# Patient Record
Sex: FEMALE | Race: WHITE | NOT HISPANIC OR LATINO | Employment: UNEMPLOYED | URBAN - NONMETROPOLITAN AREA
[De-identification: names, ages, dates, MRNs, and addresses within clinical notes are randomized per-mention and may not be internally consistent; named-entity substitution may affect disease eponyms.]

---

## 2017-01-03 ENCOUNTER — LAB (OUTPATIENT)
Dept: ONCOLOGY | Facility: CLINIC | Age: 52
End: 2017-01-03

## 2017-01-03 ENCOUNTER — INFUSION (OUTPATIENT)
Dept: ONCOLOGY | Facility: HOSPITAL | Age: 52
End: 2017-01-03

## 2017-01-03 VITALS
DIASTOLIC BLOOD PRESSURE: 56 MMHG | BODY MASS INDEX: 24.13 KG/M2 | TEMPERATURE: 97.9 F | WEIGHT: 158.7 LBS | SYSTOLIC BLOOD PRESSURE: 108 MMHG | RESPIRATION RATE: 18 BRPM | OXYGEN SATURATION: 98 % | HEART RATE: 86 BPM

## 2017-01-03 DIAGNOSIS — C34.31 MALIGNANT NEOPLASM OF LOWER LOBE OF RIGHT LUNG (HCC): Primary | ICD-10-CM

## 2017-01-03 DIAGNOSIS — C34.31 MALIGNANT NEOPLASM OF LOWER LOBE OF RIGHT LUNG (HCC): ICD-10-CM

## 2017-01-03 LAB
ALBUMIN SERPL-MCNC: 4.3 G/DL (ref 3.5–5)
ALBUMIN/GLOB SERPL: 1.6 G/DL (ref 1.5–2.5)
ALP SERPL-CCNC: 101 U/L (ref 46–116)
ALT SERPL W P-5'-P-CCNC: 15 U/L (ref 10–36)
ANION GAP SERPL CALCULATED.3IONS-SCNC: 4.5 MMOL/L (ref 3.6–11.2)
ANISOCYTOSIS BLD QL: NORMAL
AST SERPL-CCNC: 22 U/L (ref 10–30)
BASOPHILS # BLD AUTO: 0.04 10*3/MM3 (ref 0–0.3)
BASOPHILS NFR BLD AUTO: 0.7 % (ref 0–2)
BILIRUB SERPL-MCNC: 0.3 MG/DL (ref 0.2–1.8)
BUN BLD-MCNC: 12 MG/DL (ref 7–21)
BUN/CREAT SERPL: 14.8 (ref 7–25)
CALCIUM SPEC-SCNC: 9.4 MG/DL (ref 7.7–10)
CHLORIDE SERPL-SCNC: 111 MMOL/L (ref 99–112)
CO2 SERPL-SCNC: 27.5 MMOL/L (ref 24.3–31.9)
CREAT BLD-MCNC: 0.81 MG/DL (ref 0.43–1.29)
DACRYOCYTES BLD QL SMEAR: NORMAL
DEPRECATED RDW RBC AUTO: 78.5 FL (ref 37–54)
EOSINOPHIL # BLD AUTO: 0.04 10*3/MM3 (ref 0–0.7)
EOSINOPHIL NFR BLD AUTO: 0.7 % (ref 0–5)
ERYTHROCYTE [DISTWIDTH] IN BLOOD BY AUTOMATED COUNT: 19.4 % (ref 11.5–14.5)
GFR SERPL CREATININE-BSD FRML MDRD: 74 ML/MIN/1.73
GLOBULIN UR ELPH-MCNC: 2.7 GM/DL
GLUCOSE BLD-MCNC: 104 MG/DL (ref 70–110)
HCT VFR BLD AUTO: 31.1 % (ref 37–47)
HGB BLD-MCNC: 9.8 G/DL (ref 12–16)
IMM GRANULOCYTES # BLD: 0.05 10*3/MM3 (ref 0–0.03)
IMM GRANULOCYTES NFR BLD: 0.9 % (ref 0–0.5)
LYMPHOCYTES # BLD AUTO: 1.43 10*3/MM3 (ref 1–3)
LYMPHOCYTES NFR BLD AUTO: 24.3 % (ref 21–51)
MACROCYTES BLD QL SMEAR: NORMAL
MCH RBC QN AUTO: 34.8 PG (ref 27–33)
MCHC RBC AUTO-ENTMCNC: 31.5 G/DL (ref 33–37)
MCV RBC AUTO: 110.3 FL (ref 80–94)
MONOCYTES # BLD AUTO: 0.59 10*3/MM3 (ref 0.1–0.9)
MONOCYTES NFR BLD AUTO: 10 % (ref 0–10)
NEUTROPHILS # BLD AUTO: 3.73 10*3/MM3 (ref 1.4–6.5)
NEUTROPHILS NFR BLD AUTO: 63.4 % (ref 30–70)
OSMOLALITY SERPL CALC.SUM OF ELEC: 285 MOSM/KG (ref 273–305)
OVALOCYTES BLD QL SMEAR: NORMAL
PLAT MORPH BLD: NORMAL
PLATELET # BLD AUTO: 207 10*3/MM3 (ref 130–400)
PMV BLD AUTO: 11.4 FL (ref 6–10)
POTASSIUM BLD-SCNC: 3.6 MMOL/L (ref 3.5–5.3)
PROT SERPL-MCNC: 7 G/DL (ref 6–8)
RBC # BLD AUTO: 2.82 10*6/MM3 (ref 4.2–5.4)
SODIUM BLD-SCNC: 143 MMOL/L (ref 135–153)
WBC NRBC COR # BLD: 5.88 10*3/MM3 (ref 4.5–12.5)

## 2017-01-03 PROCEDURE — 96361 HYDRATE IV INFUSION ADD-ON: CPT

## 2017-01-03 PROCEDURE — 25010000002 HEPARIN FLUSH (PORCINE) 100 UNIT/ML SOLUTION: Performed by: INTERNAL MEDICINE

## 2017-01-03 PROCEDURE — 96417 CHEMO IV INFUS EACH ADDL SEQ: CPT

## 2017-01-03 PROCEDURE — 25010000002 CARBOPLATIN PER 50 MG: Performed by: INTERNAL MEDICINE

## 2017-01-03 PROCEDURE — 96413 CHEMO IV INFUSION 1 HR: CPT

## 2017-01-03 PROCEDURE — 25010000002 PALONOSETRON PER 25 MCG: Performed by: INTERNAL MEDICINE

## 2017-01-03 PROCEDURE — 25010000002 ETOPOSIDE 500 MG/25ML SOLUTION 25 ML VIAL: Performed by: INTERNAL MEDICINE

## 2017-01-03 PROCEDURE — 96375 TX/PRO/DX INJ NEW DRUG ADDON: CPT

## 2017-01-03 RX ORDER — ALPRAZOLAM 1 MG/1
1 TABLET ORAL 3 TIMES DAILY PRN
Qty: 90 TABLET | Refills: 0 | Status: SHIPPED | OUTPATIENT
Start: 2017-01-03 | End: 2017-02-03 | Stop reason: SDUPTHER

## 2017-01-03 RX ORDER — SODIUM CHLORIDE 0.9 % (FLUSH) 0.9 %
10 SYRINGE (ML) INJECTION AS NEEDED
Status: CANCELLED | OUTPATIENT
Start: 2017-01-04

## 2017-01-03 RX ORDER — OXYCODONE AND ACETAMINOPHEN 10; 325 MG/1; MG/1
1 TABLET ORAL ONCE
Status: COMPLETED | OUTPATIENT
Start: 2017-01-03 | End: 2017-01-03

## 2017-01-03 RX ORDER — PALONOSETRON 0.05 MG/ML
0.25 INJECTION, SOLUTION INTRAVENOUS ONCE
Status: COMPLETED | OUTPATIENT
Start: 2017-01-03 | End: 2017-01-03

## 2017-01-03 RX ORDER — SODIUM CHLORIDE 9 MG/ML
250 INJECTION, SOLUTION INTRAVENOUS ONCE
Status: COMPLETED | OUTPATIENT
Start: 2017-01-03 | End: 2017-01-03

## 2017-01-03 RX ORDER — LIDOCAINE AND PRILOCAINE 25; 25 MG/G; MG/G
CREAM TOPICAL AS NEEDED
Status: CANCELLED | OUTPATIENT
Start: 2017-01-03

## 2017-01-03 RX ORDER — OXYCODONE AND ACETAMINOPHEN 10; 325 MG/1; MG/1
1 TABLET ORAL EVERY 4 HOURS PRN
Qty: 180 TABLET | Refills: 0 | Status: SHIPPED | OUTPATIENT
Start: 2017-01-03 | End: 2017-02-03 | Stop reason: SDUPTHER

## 2017-01-03 RX ORDER — HEPARIN SODIUM (PORCINE) LOCK FLUSH IV SOLN 100 UNIT/ML 100 UNIT/ML
300 SOLUTION INTRAVENOUS ONCE
Status: CANCELLED | OUTPATIENT
Start: 2017-01-03

## 2017-01-03 RX ADMIN — HEPARIN SODIUM (PORCINE) LOCK FLUSH IV SOLN 100 UNIT/ML 500 UNITS: 100 SOLUTION at 13:45

## 2017-01-03 RX ADMIN — CARBOPLATIN 550 MG: 10 INJECTION, SOLUTION INTRAVENOUS at 12:48

## 2017-01-03 RX ADMIN — PALONOSETRON HYDROCHLORIDE 0.25 MG: 0.25 INJECTION INTRAVENOUS at 11:30

## 2017-01-03 RX ADMIN — ETOPOSIDE 190 MG: 20 INJECTION, SOLUTION, CONCENTRATE INTRAVENOUS at 11:40

## 2017-01-03 RX ADMIN — OXYCODONE HYDROCHLORIDE AND ACETAMINOPHEN 1 TABLET: 10; 325 TABLET ORAL at 11:15

## 2017-01-03 RX ADMIN — SODIUM CHLORIDE 250 ML: 9 INJECTION, SOLUTION INTRAVENOUS at 11:29

## 2017-01-04 ENCOUNTER — APPOINTMENT (OUTPATIENT)
Dept: ONCOLOGY | Facility: HOSPITAL | Age: 52
End: 2017-01-04

## 2017-01-04 ENCOUNTER — INFUSION (OUTPATIENT)
Dept: ONCOLOGY | Facility: HOSPITAL | Age: 52
End: 2017-01-04

## 2017-01-04 VITALS
SYSTOLIC BLOOD PRESSURE: 95 MMHG | HEART RATE: 72 BPM | DIASTOLIC BLOOD PRESSURE: 55 MMHG | WEIGHT: 162.6 LBS | TEMPERATURE: 97 F | BODY MASS INDEX: 24.72 KG/M2 | OXYGEN SATURATION: 99 % | RESPIRATION RATE: 18 BRPM

## 2017-01-04 DIAGNOSIS — C34.31 MALIGNANT NEOPLASM OF LOWER LOBE OF RIGHT LUNG (HCC): Primary | ICD-10-CM

## 2017-01-04 DIAGNOSIS — R30.0 BURNING WITH URINATION: ICD-10-CM

## 2017-01-04 LAB
BACTERIA UR QL AUTO: ABNORMAL /HPF
BILIRUB UR QL STRIP: NEGATIVE
CLARITY UR: ABNORMAL
COLOR UR: YELLOW
GLUCOSE UR STRIP-MCNC: NEGATIVE MG/DL
HGB UR QL STRIP.AUTO: NEGATIVE
HYALINE CASTS UR QL AUTO: ABNORMAL /LPF
KETONES UR QL STRIP: NEGATIVE
LEUKOCYTE ESTERASE UR QL STRIP.AUTO: NEGATIVE
NITRITE UR QL STRIP: NEGATIVE
PH UR STRIP.AUTO: <=5 [PH] (ref 5–8)
PROT UR QL STRIP: NEGATIVE
RBC # UR: ABNORMAL /HPF
REF LAB TEST METHOD: ABNORMAL
SP GR UR STRIP: 1.03 (ref 1–1.03)
SQUAMOUS #/AREA URNS HPF: ABNORMAL /HPF
UROBILINOGEN UR QL STRIP: ABNORMAL
WBC UR QL AUTO: ABNORMAL /HPF

## 2017-01-04 PROCEDURE — 96413 CHEMO IV INFUSION 1 HR: CPT

## 2017-01-04 PROCEDURE — 25010000002 ETOPOSIDE 500 MG/25ML SOLUTION 25 ML VIAL: Performed by: INTERNAL MEDICINE

## 2017-01-04 PROCEDURE — 25010000002 HEPARIN FLUSH (PORCINE) 100 UNIT/ML SOLUTION: Performed by: INTERNAL MEDICINE

## 2017-01-04 RX ORDER — SODIUM CHLORIDE 9 MG/ML
250 INJECTION, SOLUTION INTRAVENOUS ONCE
Status: COMPLETED | OUTPATIENT
Start: 2017-01-04 | End: 2017-01-04

## 2017-01-04 RX ORDER — HEPARIN SODIUM (PORCINE) LOCK FLUSH IV SOLN 100 UNIT/ML 100 UNIT/ML
300 SOLUTION INTRAVENOUS ONCE
Status: CANCELLED | OUTPATIENT
Start: 2017-01-04

## 2017-01-04 RX ORDER — LIDOCAINE AND PRILOCAINE 25; 25 MG/G; MG/G
CREAM TOPICAL AS NEEDED
Status: CANCELLED | OUTPATIENT
Start: 2017-01-04

## 2017-01-04 RX ORDER — AMOXICILLIN AND CLAVULANATE POTASSIUM 875; 125 MG/1; MG/1
1 TABLET, FILM COATED ORAL 2 TIMES DAILY
Qty: 20 TABLET | Refills: 0 | Status: SHIPPED | OUTPATIENT
Start: 2017-01-04 | End: 2017-03-03

## 2017-01-04 RX ORDER — SODIUM CHLORIDE 0.9 % (FLUSH) 0.9 %
10 SYRINGE (ML) INJECTION AS NEEDED
Status: CANCELLED | OUTPATIENT
Start: 2017-01-05

## 2017-01-04 RX ADMIN — ETOPOSIDE 190 MG: 20 INJECTION, SOLUTION, CONCENTRATE INTRAVENOUS at 12:24

## 2017-01-04 RX ADMIN — HEPARIN SODIUM (PORCINE) LOCK FLUSH IV SOLN 100 UNIT/ML 500 UNITS: 100 SOLUTION at 13:50

## 2017-01-04 RX ADMIN — SODIUM CHLORIDE 250 ML: 9 INJECTION, SOLUTION INTRAVENOUS at 12:20

## 2017-01-05 ENCOUNTER — INFUSION (OUTPATIENT)
Dept: ONCOLOGY | Facility: HOSPITAL | Age: 52
End: 2017-01-05

## 2017-01-05 VITALS
DIASTOLIC BLOOD PRESSURE: 70 MMHG | OXYGEN SATURATION: 98 % | WEIGHT: 164.2 LBS | HEART RATE: 75 BPM | RESPIRATION RATE: 18 BRPM | BODY MASS INDEX: 24.97 KG/M2 | SYSTOLIC BLOOD PRESSURE: 111 MMHG | TEMPERATURE: 97 F

## 2017-01-05 DIAGNOSIS — C34.31 MALIGNANT NEOPLASM OF LOWER LOBE OF RIGHT LUNG (HCC): Primary | ICD-10-CM

## 2017-01-05 PROCEDURE — 96372 THER/PROPH/DIAG INJ SC/IM: CPT

## 2017-01-05 PROCEDURE — 25010000002 ETOPOSIDE 500 MG/25ML SOLUTION 25 ML VIAL: Performed by: INTERNAL MEDICINE

## 2017-01-05 PROCEDURE — 25010000002 HEPARIN FLUSH (PORCINE) 100 UNIT/ML SOLUTION: Performed by: INTERNAL MEDICINE

## 2017-01-05 PROCEDURE — 25010000002 PEGFILGRASTIM 6 MG/0.6ML PREFILLED SYRINGE KIT: Performed by: INTERNAL MEDICINE

## 2017-01-05 PROCEDURE — 96413 CHEMO IV INFUSION 1 HR: CPT

## 2017-01-05 RX ORDER — LIDOCAINE AND PRILOCAINE 25; 25 MG/G; MG/G
CREAM TOPICAL AS NEEDED
Status: CANCELLED | OUTPATIENT
Start: 2017-01-05

## 2017-01-05 RX ORDER — SODIUM CHLORIDE 9 MG/ML
250 INJECTION, SOLUTION INTRAVENOUS ONCE
Status: COMPLETED | OUTPATIENT
Start: 2017-01-05 | End: 2017-01-05

## 2017-01-05 RX ORDER — SODIUM CHLORIDE 0.9 % (FLUSH) 0.9 %
10 SYRINGE (ML) INJECTION AS NEEDED
Status: CANCELLED | OUTPATIENT
Start: 2017-03-03

## 2017-01-05 RX ORDER — HEPARIN SODIUM (PORCINE) LOCK FLUSH IV SOLN 100 UNIT/ML 100 UNIT/ML
300 SOLUTION INTRAVENOUS ONCE
Status: CANCELLED | OUTPATIENT
Start: 2017-01-05

## 2017-01-05 RX ADMIN — ETOPOSIDE 190 MG: 20 INJECTION, SOLUTION, CONCENTRATE INTRAVENOUS at 11:53

## 2017-01-05 RX ADMIN — HEPARIN SODIUM (PORCINE) LOCK FLUSH IV SOLN 100 UNIT/ML 500 UNITS: 100 SOLUTION at 13:11

## 2017-01-05 RX ADMIN — SODIUM CHLORIDE 250 ML: 900 INJECTION, SOLUTION INTRAVENOUS at 11:50

## 2017-01-05 RX ADMIN — PEGFILGRASTIM 6 MG: KIT SUBCUTANEOUS at 13:10

## 2017-01-06 ENCOUNTER — TELEPHONE (OUTPATIENT)
Dept: ONCOLOGY | Facility: HOSPITAL | Age: 52
End: 2017-01-06

## 2017-01-06 NOTE — TELEPHONE ENCOUNTER
Attempted to call pt to follow-up on treatment from yesterday. No answer. Left message for pt to return call for any questions or concerns.

## 2017-01-07 LAB — BACTERIA SPEC AEROBE CULT: NO GROWTH

## 2017-01-16 ENCOUNTER — APPOINTMENT (OUTPATIENT)
Dept: CT IMAGING | Facility: HOSPITAL | Age: 52
End: 2017-01-16
Attending: INTERNAL MEDICINE

## 2017-01-17 ENCOUNTER — LAB (OUTPATIENT)
Dept: ONCOLOGY | Facility: CLINIC | Age: 52
End: 2017-01-17

## 2017-01-17 ENCOUNTER — HOSPITAL ENCOUNTER (OUTPATIENT)
Dept: CT IMAGING | Facility: HOSPITAL | Age: 52
Discharge: HOME | End: 2017-01-17
Attending: INTERNAL MEDICINE | Admitting: INTERNAL MEDICINE

## 2017-01-17 ENCOUNTER — HOSPITAL ENCOUNTER (OUTPATIENT)
Dept: CT IMAGING | Facility: HOSPITAL | Age: 52
Discharge: HOME | End: 2017-01-17
Attending: INTERNAL MEDICINE

## 2017-01-17 ENCOUNTER — OFFICE VISIT (OUTPATIENT)
Dept: ONCOLOGY | Facility: CLINIC | Age: 52
End: 2017-01-17

## 2017-01-17 VITALS
SYSTOLIC BLOOD PRESSURE: 119 MMHG | OXYGEN SATURATION: 100 % | WEIGHT: 162.5 LBS | RESPIRATION RATE: 18 BRPM | DIASTOLIC BLOOD PRESSURE: 63 MMHG | TEMPERATURE: 98.1 F | HEART RATE: 85 BPM | BODY MASS INDEX: 24.71 KG/M2

## 2017-01-17 DIAGNOSIS — C34.31 MALIGNANT NEOPLASM OF LOWER LOBE OF RIGHT LUNG (HCC): ICD-10-CM

## 2017-01-17 DIAGNOSIS — C34.31 MALIGNANT NEOPLASM OF LOWER LOBE OF RIGHT LUNG (HCC): Primary | ICD-10-CM

## 2017-01-17 LAB
ALBUMIN SERPL-MCNC: 4.1 G/DL (ref 3.5–5)
ALBUMIN/GLOB SERPL: 1.6 G/DL (ref 1.5–2.5)
ALP SERPL-CCNC: 99 U/L (ref 46–116)
ALT SERPL W P-5'-P-CCNC: 11 U/L (ref 10–36)
ANION GAP SERPL CALCULATED.3IONS-SCNC: 6.7 MMOL/L (ref 3.6–11.2)
ANISOCYTOSIS BLD QL: NORMAL
AST SERPL-CCNC: 16 U/L (ref 10–30)
BASOPHILS # BLD AUTO: 0.01 10*3/MM3 (ref 0–0.3)
BASOPHILS NFR BLD AUTO: 0.4 % (ref 0–2)
BILIRUB SERPL-MCNC: 0.3 MG/DL (ref 0.2–1.8)
BUN BLD-MCNC: <5 MG/DL (ref 7–21)
BUN/CREAT SERPL: ABNORMAL (ref 7–25)
CALCIUM SPEC-SCNC: 9.6 MG/DL (ref 7.7–10)
CHLORIDE SERPL-SCNC: 108 MMOL/L (ref 99–112)
CO2 SERPL-SCNC: 28.3 MMOL/L (ref 24.3–31.9)
CREAT BLD-MCNC: 0.92 MG/DL (ref 0.43–1.29)
DEPRECATED RDW RBC AUTO: 59.5 FL (ref 37–54)
EOSINOPHIL # BLD AUTO: 0.01 10*3/MM3 (ref 0–0.7)
EOSINOPHIL NFR BLD AUTO: 0.4 % (ref 0–5)
ERYTHROCYTE [DISTWIDTH] IN BLOOD BY AUTOMATED COUNT: 15.6 % (ref 11.5–14.5)
GFR SERPL CREATININE-BSD FRML MDRD: 64 ML/MIN/1.73
GLOBULIN UR ELPH-MCNC: 2.5 GM/DL
GLUCOSE BLD-MCNC: 108 MG/DL (ref 70–110)
HCT VFR BLD AUTO: 23.4 % (ref 37–47)
HGB BLD-MCNC: 7.1 G/DL (ref 12–16)
IMM GRANULOCYTES # BLD: 0.01 10*3/MM3 (ref 0–0.03)
IMM GRANULOCYTES NFR BLD: 0.4 % (ref 0–0.5)
LYMPHOCYTES # BLD AUTO: 0.97 10*3/MM3 (ref 1–3)
LYMPHOCYTES NFR BLD AUTO: 40.4 % (ref 21–51)
MCH RBC QN AUTO: 33 PG (ref 27–33)
MCHC RBC AUTO-ENTMCNC: 30.3 G/DL (ref 33–37)
MCV RBC AUTO: 108.8 FL (ref 80–94)
MONOCYTES # BLD AUTO: 0.16 10*3/MM3 (ref 0.1–0.9)
MONOCYTES NFR BLD AUTO: 6.7 % (ref 0–10)
NEUTROPHILS # BLD AUTO: 1.24 10*3/MM3 (ref 1.4–6.5)
NEUTROPHILS NFR BLD AUTO: 51.7 % (ref 30–70)
OSMOLALITY SERPL CALC.SUM OF ELEC: NORMAL MOSM/KG (ref 273–305)
PLATELET # BLD AUTO: 17 10*3/MM3 (ref 130–400)
PMV BLD AUTO: 10.3 FL (ref 6–10)
POIKILOCYTOSIS BLD QL SMEAR: NORMAL
POTASSIUM BLD-SCNC: 4.4 MMOL/L (ref 3.5–5.3)
PROT SERPL-MCNC: 6.6 G/DL (ref 6–8)
RBC # BLD AUTO: 2.15 10*6/MM3 (ref 4.2–5.4)
SMALL PLATELETS BLD QL SMEAR: NORMAL
SODIUM BLD-SCNC: 143 MMOL/L (ref 135–153)
WBC NRBC COR # BLD: 2.4 10*3/MM3 (ref 4.5–12.5)

## 2017-01-17 PROCEDURE — 74177 CT ABD & PELVIS W/CONTRAST: CPT

## 2017-01-17 PROCEDURE — 74177 CT ABD & PELVIS W/CONTRAST: CPT | Performed by: RADIOLOGY

## 2017-01-17 PROCEDURE — 0 IOPAMIDOL 61 % SOLUTION: Performed by: INTERNAL MEDICINE

## 2017-01-17 PROCEDURE — 80053 COMPREHEN METABOLIC PANEL: CPT | Performed by: INTERNAL MEDICINE

## 2017-01-17 PROCEDURE — 85007 BL SMEAR W/DIFF WBC COUNT: CPT | Performed by: INTERNAL MEDICINE

## 2017-01-17 PROCEDURE — 99214 OFFICE O/P EST MOD 30 MIN: CPT | Performed by: INTERNAL MEDICINE

## 2017-01-17 PROCEDURE — 71260 CT THORAX DX C+: CPT | Performed by: RADIOLOGY

## 2017-01-17 PROCEDURE — 85025 COMPLETE CBC W/AUTO DIFF WBC: CPT | Performed by: INTERNAL MEDICINE

## 2017-01-17 PROCEDURE — 71260 CT THORAX DX C+: CPT

## 2017-01-17 RX ADMIN — IOPAMIDOL 100 ML: 612 INJECTION, SOLUTION INTRAVENOUS at 09:40

## 2017-01-17 NOTE — PROGRESS NOTES
Name:  Kylie Cat  :  1965  Date:  2017     REFERRING PHYSICIAN  Srinivasa Foreman MD    PRIMARY CARE PHYSICIAN  Srinivasa Foreman MD    REASON FOR FOLLOWUP  1. Malignant neoplasm of lower lobe of right lung      CHIEF COMPLAINT  Accumulating fatigue. Occasional chest and flank pains, stable. Anxiety over cancer diagnosis, stable.    Dear Dr. Foreman,    HISTORY OF PRESENT ILLNESS:   I saw Ms. Cat in follow up today in our medical oncology clinic.  As you are aware, she is a pleasant, 51 y.o., white female with a history of hypertension, COPD and chronic tobacco abuse who, in early summer 2016, as part of a workup for possible open-heart surgery, was found to have a right lower lobe lung mass (along with mediastinal and bilateral hilar adenopathy) on preop imaging.  A bronchoscopy with EBUS was performed in mid-2016, and the results were consistent with small cell carcinoma.  She was referred to our clinic for further evaluation and management; and, at the time of her initial appointment with us (on 2016), arrangements were made to have a powerport placed and begin systemic chemotherapy with platinum/etoposide. Although, per patient preference, the start of her treatment was delayed a couple of times, she ultimately began this therapy by early 2016.    INTERIM HISTORY:  Ms. Cat presents to clinic today again accompanied by her boyfriend.  She began carboplatin/etoposide in early 2016, has received six cycles to date and has overall tolerated it well so far (with some manageable nausea for a few days following each cycle her only noticeable side effect). She has been getting progressively more and more fatigued. She continues to have baseline shortness of breath, but this is still not any worse than usual.  She also continues to have intermittent chest/flank pains and remains anxious over her cancer diagnosis.    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   • Stroke        Past Surgical History   Procedure Laterality Date   • Cardiac catheterization     • Hysterectomy     • Laparoscopic tubal ligation     • Ovarian cyst removal Right      right breast and right ovary    • Laparoscopic salpingoopherectomy Right    • Abdominal surgery     • Appendectomy     • Polypectomy       (throat)   • Breast surgery       right cyst removal    • Tympanoplasty     • Sinus surgery     • Laparoscopic lysis of adhesions     • Portacath placement N/A 8/11/2016     Procedure: INSERTION OF PORTACATH;  Surgeon: Nilson Day MD;  Location: [facility];  Service:        Social History     Social History   • Marital status:      Spouse name: N/A   • Number of children: N/A   • Years of education: N/A     Occupational History   • Not on file.     Social History Main Topics   • Smoking status: Current Every Day Smoker     Packs/day: 1.00     Years: 42.00     Types: Cigarettes   • Smokeless tobacco: Not on file   • Alcohol use Yes      Comment: occ   • Drug use: Yes     Special: Marijuana   • Sexual activity: Defer     Other Topics Concern   • Not on file     Social History Narrative     FAMILY HISTORY  A few smoking-related malignancies in the family.    Allergies   Allergen Reactions   • Ciprofloxacin Hcl Anaphylaxis   • Decadron [Dexamethasone] Anaphylaxis   • Darvon [Propoxyphene] Hives   • Latex    • Keflex [Cephalexin] Other (See Comments)   • Morphine And Related Rash     Reported by patient and  her spouse        Current Outpatient Prescriptions   Medication Sig Dispense Refill   • acetaminophen (TYLENOL) 500 MG tablet Take 500 mg by mouth as needed for mild pain (1-3).     • ALPRAZolam (XANAX) 1 MG tablet Take 1 tablet by mouth 3 (Three) Times a Day As Needed for anxiety. 90 tablet 0   • amLODIPine (NORVASC) 10 MG tablet Take 10 mg by mouth daily.     • amoxicillin-clavulanate (AUGMENTIN) 875-125 MG per tablet Take 1 tablet by mouth 2 (Two) Times a Day. 20 tablet 0   • aspirin 81 MG EC tablet Take 81 mg by mouth daily.     • atorvastatin (LIPITOR) 80 MG tablet Take 80 mg by mouth daily.     • azithromycin (ZITHROMAX Z-PAMELA) 250 MG tablet Take 2 tablets the first day, then 1 tablet daily for 4 days. 6 tablet 0   • benzonatate (TESSALON) 200 MG capsule Take 1 capsule by mouth 3 (Three) Times a Day As Needed for cough. 90 capsule 1   • bisoprolol (ZEBETA) 10 MG tablet Take 10 mg by mouth daily.     • cetirizine (ZyrTEC) 10 MG tablet Take 10 mg by mouth daily.     • clopidogrel (PLAVIX) 75 MG tablet Take 75 mg by mouth daily.     • clotrimazole-betamethasone (LOTRISONE) 1-0.05 % cream Apply  topically 2 (two) times a day. 45 gm apply to affected area 3 times per day     • diphenhydrAMINE (BENADRYL) 25 mg capsule Take 25 mg by mouth every 4 (four) hours as needed for itching.     • docusate sodium (COLACE) 100 MG capsule Take 1 capsule by mouth 2 (Two) Times a Day. 60 capsule 2   • fenofibrate 160 MG tablet Take 134 mg by mouth daily.     • fluconazole (DIFLUCAN) 200 MG tablet 2 tab by mouth today then one tab daily for 9 days 11 tablet 0   • Fluticasone Furoate-Vilanterol (BREO ELLIPTA IN) Inhale.     • gabapentin (NEURONTIN) 800 MG tablet Take 800 mg by mouth daily.     • hydrOXYzine (ATARAX) 25 MG tablet Take 1 tablet by mouth every 6 (six) hours as needed for itching. 60 tablet 3   • ipratropium-albuterol (DUO-NEB) 0.5-2.5 mg/mL nebulizer Take 3 mL by nebulization every 4 (four) hours as needed for wheezing.      • lidocaine-prilocaine (EMLA) 2.5-2.5 % cream Apply  topically as needed for mild pain (1-3). Apply to port site approximately 1 hour prior to procedure. 30 g 5   • loratadine (CLARITIN) 10 MG tablet Take 10 mg by mouth daily.     • nitroglycerin (NITRODUR) 0.2 MG/HR patch Place 1 patch on the skin daily.     • omeprazole (PriLOSEC) 40 MG capsule Take 40 mg by mouth daily.     • ondansetron ODT (ZOFRAN ODT) 8 MG disintegrating tablet Dissolve 1 tablet on the Tongue Every 8 (Eight) Hours As Needed for nausea or vomiting. 30 tablet 5   • oxyCODONE ER (oxyCONTIN) 15 MG tablet extended-release 12 hour Take 1 tablet by mouth Every 12 (Twelve) Hours. 60 tablet 0   • oxyCODONE-acetaminophen (PERCOCET)  MG per tablet Take 1 tablet by mouth Every 4 (Four) Hours As Needed for moderate pain 180 tablet 0   • pantoprazole (PROTONIX) 40 MG EC tablet Take 40 mg by mouth daily.     • potassium citrate (UROCIT-K) 10 MEQ (1080 MG) CR tablet Take 10 mEq by mouth 3 (three) times a day with meals.     • prochlorperazine (COMPAZINE) 10 MG tablet Take 1 tablet by mouth every 6 (six) hours as needed for nausea or vomiting. 30 tablet 5   • promethazine (PHENERGAN) 25 MG tablet Take 1 tablet by mouth Every 8 (Eight) Hours As Needed for nausea or vomiting. 60 tablet 1   • QUEtiapine (SEROquel) 50 MG tablet Take 50 mg by mouth every night.       No current facility-administered medications for this visit.        REVIEW OF SYSTEMS  CONSTITUTIONAL:  No fever, chills or night sweats. Accumulating fatigue with ongoing chemotherapy.  EYES:  No blurry vision, diplopia or other vision changes.  ENT:  No hearing loss, nosebleeds or sore throat.  CARDIOVASCULAR:  No palpitations, arrhythmia, syncopal episodes or edema.  PULMONARY:  No hemoptysis or wheezing.  Baseline shortness of breath, as per the HPI above.  GASTROINTESTINAL:  No constipation or diarrhea.  No abdominal pain. Nausea/occasional vomiting for a few days following each cycle of  chemotherapy, as per the HPI above.  GENITOURINARY:  No hematuria, kidney stones or frequent urination.  MUSCULOSKELETAL:  Some chronic joint and back pains, stable. Intermittent chest/flank pains, stable.  INTEGUMENTARY: No rashes or pruritus.  ENDOCRINE:  No excessive thirst or hot flashes.  HEMATOLOGIC:  No history of free bleeding, spontaneous bleeding or clotting.  IMMUNOLOGIC:  No allergies or frequent infections.  NEUROLOGIC: No numbness, tingling, seizures or weakness.  PSYCHIATRIC:  No depression.  Increased anxiety over her cancer diagnosis, stable.    PHYSICAL EXAMINATION    Visit Vitals   • /63   • Pulse 85   • Temp 98.1 °F (36.7 °C) (Oral)   • Resp 18   • Wt 162 lb 8 oz (73.7 kg)   • SpO2 100%   • BMI 24.71 kg/m2       GENERAL:  A well-developed, well-nourished, thin, white female in no acute distress.  HEENT:  Pupils equally round and reactive to light.  Extraocular muscles intact. Positive alopecia, wearing a head scarf.  CARDIOVASCULAR:  Regular rate and rhythm.  No murmurs, gallops or rubs.  LUNGS:  Slightly decreased breath sounds in the right lung base, otherwise clear to auscultation.  ABDOMEN:  Soft, nontender, nondistended with positive bowel sounds.  EXTREMITIES:  No clubbing, cyanosis or edema bilaterally.  SKIN:  No rashes or petechiae.  NEURO:  Cranial nerves grossly intact.  No focal deficits.  PSYCH:  Alert and oriented x3.    LABORATORY    Lab Results   Component Value Date    WBC 2.40 (C) 01/17/2017    HGB 7.1 (L) 01/17/2017    HCT 23.4 (L) 01/17/2017    .8 (H) 01/17/2017    PLT 17 (C) 01/17/2017    NEUTROABS 1.24 (L) 01/17/2017       Lab Results   Component Value Date     01/17/2017    K 4.4 01/17/2017     01/17/2017    CO2 28.3 01/17/2017    BUN <5 (L) 01/17/2017    CREATININE 0.92 01/17/2017    GLUCOSE 108 01/17/2017    CALCIUM 9.6 01/17/2017    AST 16 01/17/2017    ALT 11 01/17/2017    ALKPHOS 99 01/17/2017    BILITOT 0.3 01/17/2017    PROTEINTOT 6.6  01/17/2017    ALBUMIN 4.10 01/17/2017       IMAGING  NM PET with fusion CT, skull base to mid-thigh (06/10/2016):  Impression:  2.2 cm metabolically active mass in the medial aspect of the superior segment right lower lobe, most consistent with primary lung cancer. There is a right hilar metabolically active lymph node, consistent with metastatic disease. There are also metabolically active lymph nodes in the left aspect of the mediastinum and left hilum, consistent with metastatic disease. In addition, there is a linear density in the medial aspect of the left lung apex with some surrounding ground-glass opacity that is moderately metabolically active.  Based on the CT appearance this is favored to be infectious or inflammatory.    NM PET with fusion CT, skull base to mid-thigh (08/28/2016):  Impression: Hypermetabolic activity is noted in the mediastinum and hilar lymph nodes bilaterally. There are hypermetabolic lung nodules in the left upper lobe and right lung base. These measure approximately 15 mm in diameter. There is no evidence of distant metastatic disease below the diaphragm or in the neck or supraclavicular area.    MRI brain with and without contrast (10/07/2016):  Impression:  1) There are no contrast-enhancing abnormalities.  2) Few areas of increased T2 and FLAIR signal. Most notable is in the left aspect of the andrez, likely representing a focus of subacute ischemia.  3) No mass hemorrhage or midline shift.    CT chest with contrast (10/18/2016):  Impression: Previously identified left upper lobe spiculated nodular mass is no longer seen. A right upper lobe spiculated pulmonary nodule today measures 1.6 cm and was 2.0 cm. Mediastinal lymphadenopathy is no longer identified.    CT abdomen and pelvis with contrast (10/18/2016):  Impression: Unremarkable exam. No source of the patient's symptoms.    CT chest with contrast (11/29/2016):  Impression: There has been some interval development of left lower  "lobe lung collapse with possible underlying postobstructive pneumonia. The etiology is unclear, but this could be posttreatment related or potentially recurrent disease or mucus plugging. A previously mentioned right upper lobe pulmonary nodule that was 1.6 cm now measures 1.3 \"mm\".    CT abdomen and pelvis with contrast (11/29/2016):  Impression: Stable evaluation of the abdomen.    CT chest with contrast (01/17/2017):  Impression: Spiculated nodule in the right lower lobe is stable (~1.2 cm in size).    CT abdomen and pelvis with contrast (01/17/2017):  Impression:  1) Evidence of artherosclerotic vascular disease and mild dilatation of the upper abdominal aorta.  2) Large volume stool in the colon.  3) No evidence of metastatic disease to the abdomen or pelvis.    PATHOLOGY  Fine needle aspiration, AP window (smears and cell block):  Positive for malignancy, small cell carcinoma.    IMPRESSION AND PLAN  Ms. Cat is a 51 y.o., white female with:  1. Small cell lung carcinoma:  Diagnosed in June/July 2016 with a right lower lobe lung mass and mediastinal/bilateral hilar involvement and confirmed via EBUS on 07/13/2016. A subsequent PET scan showed evidence of left upper lung involvement as well. I have had multiple long discussions with the patient, her daughter and/or her boyfriend over the past few months regarding this diagnosis and its prognosis. They are aware that this disease is not classically curable; but that, particularly given her good performance status, it was (and remains) treatable and sustained remissions are possible. Given her extensive stage disease (with bilateral lung involvement) and cardiac history, the benefits of utilizing localized radiation as part of her therapy were felt to not be likely to outweigh the risks. Therefore, she was agreeable to systemic chemotherapy (starting in September 2016), has received a total of six (6) cycles of carboplatin and etoposide to date and has " overall tolerated it very well. With this therapy, repeat CT scans from 10/18/2016, 11/29/2016 and, most recently, this morning (all summarized above), confirm a very good, ongoing response in her disease. At this time, given this good response and progressive side effects (particularly fatigue and treatment-related pancytopenia), she would now benefit from a break from active chemotherapy (and she will hopefully enjoy a prolonged remission). We will see her back in clinic in three months with repeat CT scans. In the meantime, as her disease did respond so well, she would potentially benefit from a course of PCI (prophylactic cranial irradiation). She is agreeable to being referred to rad/onc to consider this course of therapy.  2. Pain:  At least in part due to issue #1. She discontinued long-acting Morphine as it caused her to itch. Continue scheduled Oxycontin and prn Percocet. Continue to monitor.  3. Anxiety:  Recently exacerbated by her diagnosis with issue #1. Continue Xanax 1 mg TID prn.  The patient and her boyfriend were in agreement with these plans.    It is a pleasure to participate in Ms. Cat's care.  Please do not hesitate to call with any questions or concerns that you may have.    A total of 30 minutes were spent coordinating this patient’s care in clinic today; 25 minutes of which were face-to-face with the patient and her boyfriend, reviewing her interim medical history, discussing the results of this morning's repeat CT scans and counseling on the current treatment and followup plan.  All questions were answered to their satisfaction.    FOLLOW UP  No further [id number] carboplatin and etoposide planned at this time. Referral made to radiation oncology to consider a course of PCI. Return to our clinic in 3 months with a CBC, CMP and CTs of the chest, abdomen and pelvis with contrast.        This document was electronically signed by JACKIE Bolaños MD January 17, 2017 12:21 PM      CC: Srinivasa  MD Yeyo Mackenzie MD, PhD

## 2017-01-24 ENCOUNTER — APPOINTMENT (OUTPATIENT)
Dept: ONCOLOGY | Facility: HOSPITAL | Age: 52
End: 2017-01-24

## 2017-01-25 ENCOUNTER — APPOINTMENT (OUTPATIENT)
Dept: ONCOLOGY | Facility: HOSPITAL | Age: 52
End: 2017-01-25

## 2017-01-25 RX ORDER — OXYCODONE HYDROCHLORIDE 15 MG/1
15 TABLET, FILM COATED, EXTENDED RELEASE ORAL EVERY 12 HOURS
Qty: 60 TABLET | Refills: 0 | Status: SHIPPED | OUTPATIENT
Start: 2017-01-25 | End: 2017-03-03 | Stop reason: SDUPTHER

## 2017-01-26 ENCOUNTER — APPOINTMENT (OUTPATIENT)
Dept: ONCOLOGY | Facility: HOSPITAL | Age: 52
End: 2017-01-26

## 2017-02-03 RX ORDER — OXYCODONE AND ACETAMINOPHEN 10; 325 MG/1; MG/1
1 TABLET ORAL EVERY 4 HOURS PRN
Qty: 180 TABLET | Refills: 0 | Status: SHIPPED | OUTPATIENT
Start: 2017-02-03 | End: 2017-03-03 | Stop reason: SDUPTHER

## 2017-02-03 RX ORDER — ALPRAZOLAM 1 MG/1
1 TABLET ORAL 3 TIMES DAILY PRN
Qty: 90 TABLET | Refills: 0 | Status: SHIPPED | OUTPATIENT
Start: 2017-02-03 | End: 2017-03-03 | Stop reason: SDUPTHER

## 2017-03-03 ENCOUNTER — INFUSION (OUTPATIENT)
Dept: ONCOLOGY | Facility: HOSPITAL | Age: 52
End: 2017-03-03

## 2017-03-03 ENCOUNTER — OFFICE VISIT (OUTPATIENT)
Dept: ONCOLOGY | Facility: CLINIC | Age: 52
End: 2017-03-03

## 2017-03-03 VITALS
HEART RATE: 87 BPM | OXYGEN SATURATION: 98 % | RESPIRATION RATE: 18 BRPM | TEMPERATURE: 97.5 F | WEIGHT: 159.4 LBS | DIASTOLIC BLOOD PRESSURE: 80 MMHG | BODY MASS INDEX: 24.24 KG/M2 | SYSTOLIC BLOOD PRESSURE: 121 MMHG

## 2017-03-03 VITALS
WEIGHT: 159.4 LBS | HEART RATE: 87 BPM | DIASTOLIC BLOOD PRESSURE: 80 MMHG | TEMPERATURE: 97.5 F | BODY MASS INDEX: 24.24 KG/M2 | OXYGEN SATURATION: 98 % | SYSTOLIC BLOOD PRESSURE: 121 MMHG | RESPIRATION RATE: 18 BRPM

## 2017-03-03 DIAGNOSIS — C34.31 MALIGNANT NEOPLASM OF LOWER LOBE OF RIGHT LUNG (HCC): ICD-10-CM

## 2017-03-03 DIAGNOSIS — C34.31 MALIGNANT NEOPLASM OF LOWER LOBE OF RIGHT LUNG (HCC): Primary | ICD-10-CM

## 2017-03-03 PROCEDURE — 99214 OFFICE O/P EST MOD 30 MIN: CPT | Performed by: INTERNAL MEDICINE

## 2017-03-03 PROCEDURE — 25010000002 HEPARIN FLUSH (PORCINE) 100 UNIT/ML SOLUTION

## 2017-03-03 PROCEDURE — 96523 IRRIG DRUG DELIVERY DEVICE: CPT

## 2017-03-03 RX ORDER — OXYCODONE AND ACETAMINOPHEN 10; 325 MG/1; MG/1
1 TABLET ORAL EVERY 4 HOURS PRN
Qty: 180 TABLET | Refills: 0 | Status: SHIPPED | OUTPATIENT
Start: 2017-03-03 | End: 2017-04-03 | Stop reason: SDUPTHER

## 2017-03-03 RX ORDER — ALPRAZOLAM 1 MG/1
1 TABLET ORAL 3 TIMES DAILY PRN
Qty: 90 TABLET | Refills: 0 | Status: SHIPPED | OUTPATIENT
Start: 2017-03-03 | End: 2017-04-03 | Stop reason: SDUPTHER

## 2017-03-03 RX ORDER — OXYCODONE HYDROCHLORIDE 15 MG/1
15 TABLET, FILM COATED, EXTENDED RELEASE ORAL EVERY 12 HOURS
Qty: 60 TABLET | Refills: 0 | Status: SHIPPED | OUTPATIENT
Start: 2017-03-03 | End: 2017-04-03 | Stop reason: SDUPTHER

## 2017-03-03 RX ORDER — HYDROXYZINE HYDROCHLORIDE 25 MG/1
25 TABLET, FILM COATED ORAL EVERY 6 HOURS PRN
Qty: 60 TABLET | Refills: 3 | Status: SHIPPED | OUTPATIENT
Start: 2017-03-03 | End: 2017-04-03 | Stop reason: SDUPTHER

## 2017-03-03 RX ORDER — AMOXICILLIN AND CLAVULANATE POTASSIUM 875; 125 MG/1; MG/1
1 TABLET, FILM COATED ORAL 2 TIMES DAILY
Qty: 20 TABLET | Refills: 1 | Status: SHIPPED | OUTPATIENT
Start: 2017-03-03 | End: 2017-05-31

## 2017-03-03 RX ADMIN — HEPARIN SODIUM (PORCINE) LOCK FLUSH IV SOLN 100 UNIT/ML 500 UNITS: 100 SOLUTION at 11:43

## 2017-03-03 NOTE — PROGRESS NOTES
Name:  Kylie Cat  :  1965  Date:  3/3/2017     REFERRING PHYSICIAN  Srinivasa Foreman MD    PRIMARY CARE PHYSICIAN  Srinivasa Foreman MD    REASON FOR FOLLOWUP  1. Malignant neoplasm of lower lobe of right lung      CHIEF COMPLAINT  Pruritic scalp lesions and persistent head/sinus congestion for the past week or two.    Dear Dr. Foreman,    HISTORY OF PRESENT ILLNESS:   I saw Ms. Cat in follow up today in our medical oncology clinic. As you are aware, she is a pleasant, 51 y.o., white female with a history of hypertension, COPD and chronic tobacco abuse who, in early summer 2016, as part of a workup for possible open-heart surgery, was found to have a right lower lobe lung mass (along with mediastinal and bilateral hilar adenopathy) on preop imaging.  A bronchoscopy with EBUS was performed in mid-2016, and the results were consistent with small cell carcinoma. She was referred to our clinic for further evaluation and management; and, at the time of her initial appointment with us (on 2016), arrangements were made to have a powerport placed and begin systemic chemotherapy with platinum/etoposide. Although, per patient preference, the start of her treatment was delayed a couple of times, she ultimately began this therapy by early 2016 and received a total of six cycles between then and mid-2017, overall tolerating it very well.    INTERIM HISTORY:  Ms. Cat returns to clinic today for followup again accompanied by her boyfriend. She continues to have baseline shortness of breath, but this is still not any worse than usual.  She also continues to have intermittent chest/flank pains and remains anxious over her cancer diagnosis (these symptoms are also stable). She underwent a two week course of PCI (prophylactic cranial irradiation) in mid-2017 and overall tolerated it well also. She made today's previously unscheduled appointment because, for the past couple of  weeks, she has had some non-healing scalp lesions and persistent head/sinus congestion.    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   • Stroke        Past Surgical History   Procedure Laterality Date   • Cardiac catheterization     • Hysterectomy     • Laparoscopic tubal ligation     • Ovarian cyst removal Right      right breast and right ovary    • Laparoscopic salpingoopherectomy Right    • Abdominal surgery     • Appendectomy     • Polypectomy       (throat)   • Breast surgery       right cyst removal    • Tympanoplasty     • Sinus surgery     • Laparoscopic lysis of adhesions     • Portacath placement N/A 8/11/2016     Procedure: INSERTION OF PORTACATH;  Surgeon: Nilson Day MD;  Location: [facility];  Service:        Social History     Social History   • Marital status:      Spouse name: N/A   • Number of children: N/A   • Years of education: N/A     Occupational History   • Not on file.     Social History Main Topics   • Smoking status: Current Every Day Smoker     Packs/day: 1.00     Years: 42.00     Types: Cigarettes   • Smokeless tobacco: Not on file   • Alcohol use Yes      Comment: occ   • Drug use: Yes     Special: Marijuana   • Sexual activity: Defer     Other Topics Concern   • Not on file     Social History Narrative     FAMILY HISTORY  A few smoking-related malignancies in the family.    Allergies   Allergen Reactions   • Ciprofloxacin Hcl Anaphylaxis   • Decadron [Dexamethasone] Anaphylaxis   • Darvon  [Propoxyphene] Hives   • Latex    • Keflex [Cephalexin] Other (See Comments)   • Morphine And Related Rash     Reported by patient and her spouse        Current Outpatient Prescriptions   Medication Sig Dispense Refill   • acetaminophen (TYLENOL) 500 MG tablet Take 500 mg by mouth as needed for mild pain (1-3).     • ALPRAZolam (XANAX) 1 MG tablet Take 1 tablet by mouth 3 Times a Day As Needed for anxiety. 90 tablet 0   • aspirin 81 MG EC tablet Take 81 mg by mouth daily.     • atorvastatin (LIPITOR) 80 MG tablet Take 80 mg by mouth daily.     • benzonatate (TESSALON) 200 MG capsule Take 1 capsule by mouth 3 (Three) Times a Day As Needed for cough. 90 capsule 1   • bisoprolol (ZEBETA) 10 MG tablet Take 10 mg by mouth daily.     • cetirizine (ZyrTEC) 10 MG tablet Take 10 mg by mouth daily.     • clopidogrel (PLAVIX) 75 MG tablet Take 75 mg by mouth daily.     • clotrimazole-betamethasone (LOTRISONE) 1-0.05 % cream Apply  topically 2 (two) times a day. 45 gm apply to affected area 3 times per day     • diphenhydrAMINE (BENADRYL) 25 mg capsule Take 25 mg by mouth every 4 (four) hours as needed for itching.     • docusate sodium (COLACE) 100 MG capsule Take 1 capsule by mouth 2 (Two) Times a Day. 60 capsule 2   • fenofibrate 160 MG tablet Take 134 mg by mouth daily.     • fluconazole (DIFLUCAN) 200 MG tablet 2 tab by mouth today then one tab daily for 9 days 11 tablet 0   • Fluticasone Furoate-Vilanterol (BREO ELLIPTA IN) Inhale.     • gabapentin (NEURONTIN) 800 MG tablet Take 800 mg by mouth daily.     • hydrOXYzine (ATARAX) 25 MG tablet Take 1 tablet by mouth Every 6 (Six) Hours As Needed for itching. 60 tablet 3   • ipratropium-albuterol (DUO-NEB) 0.5-2.5 mg/mL nebulizer Take 3 mL by nebulization every 4 (four) hours as needed for wheezing.     • lidocaine-prilocaine (EMLA) 2.5-2.5 % cream Apply  topically as needed for mild pain (1-3). Apply to port site approximately 1 hour prior to procedure. 30 g 5   • loratadine  (CLARITIN) 10 MG tablet Take 10 mg by mouth daily.     • nitroglycerin (NITRODUR) 0.2 MG/HR patch Place 1 patch on the skin daily.     • omeprazole (PriLOSEC) 40 MG capsule Take 40 mg by mouth daily.     • ondansetron ODT (ZOFRAN ODT) 8 MG disintegrating tablet Dissolve 1 tablet on the Tongue Every 8 (Eight) Hours As Needed for nausea or vomiting. 30 tablet 5   • oxyCODONE ER (oxyCONTIN) 15 MG tablet extended-release 12 hour Take 1 tablet by mouth Every 12 (Twelve) Hours. 60 tablet 0   • oxyCODONE-acetaminophen (PERCOCET)  MG per tablet Take 1 tablet by mouth Every 4 Hours As Needed for moderate pain 180 tablet 0   • pantoprazole (PROTONIX) 40 MG EC tablet Take 40 mg by mouth daily.     • potassium citrate (UROCIT-K) 10 MEQ (1080 MG) CR tablet Take 10 mEq by mouth 3 (three) times a day with meals.     • prochlorperazine (COMPAZINE) 10 MG tablet Take 1 tablet by mouth every 6 (six) hours as needed for nausea or vomiting. 30 tablet 5   • promethazine (PHENERGAN) 25 MG tablet Take 1 tablet by mouth Every 8 (Eight) Hours As Needed for nausea or vomiting. 60 tablet 1   • QUEtiapine (SEROquel) 50 MG tablet Take 50 mg by mouth every night.     • amLODIPine (NORVASC) 10 MG tablet Take 10 mg by mouth daily.     • amoxicillin-clavulanate (AUGMENTIN) 875-125 MG per tablet Take 1 tablet by mouth 2 (Two) Times a Day. 20 tablet 1   • loratadine-pseudoephedrine (CLARITIN-D 12 HOUR) 5-120 MG per 12 hr tablet Take 1 tablet by mouth 2 (Two) Times a Day As Needed for allergies. 30 tablet 0     No current facility-administered medications for this visit.        REVIEW OF SYSTEMS  CONSTITUTIONAL:  No fever, chills or night sweats. Persistent fatigue, somewhat better since stopping chemotherapy in mid-January 2017.  EYES:  No blurry vision, diplopia or other vision changes.  ENT:  No hearing loss or nosebleeds. Head/sinus congestion for the past couple of weeks, with occasional yellowish nose drainage.  CARDIOVASCULAR:  No  palpitations, arrhythmia, syncopal episodes or edema.  PULMONARY:  No hemoptysis or wheezing.  Baseline shortness of breath, as per the HPI above.  GASTROINTESTINAL:  No constipation or diarrhea.  No abdominal pain. Nausea/occasional vomiting for a few days following each cycle of chemotherapy, none recently.  GENITOURINARY:  No hematuria, kidney stones or frequent urination.  MUSCULOSKELETAL:  Some chronic joint and back pains, stable. Intermittent chest/flank pains, stable, as per the HPI above.  INTEGUMENTARY: A few, small, pruritic scalp lesions, as per the HPI above.  ENDOCRINE:  No excessive thirst or hot flashes.  HEMATOLOGIC:  No history of free bleeding, spontaneous bleeding or clotting.  IMMUNOLOGIC:  No allergies or frequent infections.  NEUROLOGIC: No numbness, tingling, seizures or weakness.  PSYCHIATRIC:  No depression.  Increased anxiety over her cancer diagnosis, stable.    PHYSICAL EXAMINATION    Visit Vitals   • /80   • Pulse 87   • Temp 97.5 °F (36.4 °C) (Oral)   • Resp 18   • Wt 159 lb 6.4 oz (72.3 kg)   • SpO2 98%   • BMI 24.24 kg/m2       GENERAL:  A well-developed, well-nourished, thin, white female in no acute distress.  HEENT:  Pupils equally round and reactive to light.  Extraocular muscles intact. Positive alopecia. Scalp and upper shoulders: a few, tiny (~2-5 mm) superficial ulcers with excoriations but no erythema or other signs of infection.  CARDIOVASCULAR:  Regular rate and rhythm.  No murmurs, gallops or rubs.  LUNGS:  Clear to auscultation bilaterally.  ABDOMEN:  Soft, nontender, nondistended with positive bowel sounds.  EXTREMITIES:  No clubbing, cyanosis or edema bilaterally.  SKIN:  No rashes or petechiae.  NEURO:  Cranial nerves grossly intact.  No focal deficits.  PSYCH:  Alert and oriented x3.    LABORATORY    Lab Results   Component Value Date    WBC 2.40 (C) 01/17/2017    HGB 7.1 (L) 01/17/2017    HCT 23.4 (L) 01/17/2017    .8 (H) 01/17/2017    PLT 17 (C)  01/17/2017    NEUTROABS 1.24 (L) 01/17/2017       Lab Results   Component Value Date     01/17/2017    K 4.4 01/17/2017     01/17/2017    CO2 28.3 01/17/2017    BUN <5 (L) 01/17/2017    CREATININE 0.92 01/17/2017    GLUCOSE 108 01/17/2017    CALCIUM 9.6 01/17/2017    AST 16 01/17/2017    ALT 11 01/17/2017    ALKPHOS 99 01/17/2017    BILITOT 0.3 01/17/2017    PROTEINTOT 6.6 01/17/2017    ALBUMIN 4.10 01/17/2017       IMAGING  NM PET with fusion CT, skull base to mid-thigh (06/10/2016):  Impression:  2.2 cm metabolically active mass in the medial aspect of the superior segment right lower lobe, most consistent with primary lung cancer. There is a right hilar metabolically active lymph node, consistent with metastatic disease. There are also metabolically active lymph nodes in the left aspect of the mediastinum and left hilum, consistent with metastatic disease. In addition, there is a linear density in the medial aspect of the left lung apex with some surrounding ground-glass opacity that is moderately metabolically active.  Based on the CT appearance this is favored to be infectious or inflammatory.    NM PET with fusion CT, skull base to mid-thigh (08/28/2016):  Impression: Hypermetabolic activity is noted in the mediastinum and hilar lymph nodes bilaterally. There are hypermetabolic lung nodules in the left upper lobe and right lung base. These measure approximately 15 mm in diameter. There is no evidence of distant metastatic disease below the diaphragm or in the neck or supraclavicular area.    MRI brain with and without contrast (10/07/2016):  Impression:  1) There are no contrast-enhancing abnormalities.  2) Few areas of increased T2 and FLAIR signal. Most notable is in the left aspect of the andrez, likely representing a focus of subacute ischemia.  3) No mass hemorrhage or midline shift.    CT chest with contrast (10/18/2016):  Impression: Previously identified left upper lobe spiculated nodular  "mass is no longer seen. A right upper lobe spiculated pulmonary nodule today measures 1.6 cm and was 2.0 cm. Mediastinal lymphadenopathy is no longer identified.    CT abdomen and pelvis with contrast (10/18/2016):  Impression: Unremarkable exam. No source of the patient's symptoms.    CT chest with contrast (11/29/2016):  Impression: There has been some interval development of left lower lobe lung collapse with possible underlying postobstructive pneumonia. The etiology is unclear, but this could be posttreatment related or potentially recurrent disease or mucus plugging. A previously mentioned right upper lobe pulmonary nodule that was 1.6 cm now measures 1.3 \"mm\".    CT abdomen and pelvis with contrast (11/29/2016):  Impression: Stable evaluation of the abdomen.    CT chest with contrast (01/17/2017):  Impression: Spiculated nodule in the right lower lobe is stable (~1.2 cm in size).    CT abdomen and pelvis with contrast (01/17/2017):  Impression:  1) Evidence of artherosclerotic vascular disease and mild dilatation of the upper abdominal aorta.  2) Large volume stool in the colon.  3) No evidence of metastatic disease to the abdomen or pelvis.    PATHOLOGY  Fine needle aspiration, AP window (smears and cell block):  Positive for malignancy, small cell carcinoma.    IMPRESSION AND PLAN  Ms. Cat is a 51 y.o., white female with:  1. Small cell lung carcinoma:  Diagnosed in June/July 2016 with a right lower lobe lung mass and mediastinal/bilateral hilar involvement and confirmed via EBUS on 07/13/2016. A subsequent PET scan showed evidence of left upper lung involvement as well. I have had multiple long discussions with the patient, her daughter and/or her boyfriend over the past several months regarding this diagnosis and its prognosis. They are aware that this disease is not classically curable; but that, particularly given her good performance status, it was (and remains) treatable and sustained remissions " are possible. Given her extensive stage disease (with bilateral lung involvement) and cardiac history, the benefits of utilizing localized thoracic radiation as part of her therapy were felt to not be likely to outweigh the risks. Therefore, she was agreeable to systemic chemotherapy (starting in September 2016), received a total of six (6) cycles of carboplatin and etoposide between then and mid-January 2017 and overall tolerated them very well. With this therapy, repeat CT scans from 10/18/2016, 11/29/2016 and, most recently, 01/17/2017 (all summarized above), confirmed a very good, ongoing response in her disease. Given this good response and progressive side effects (particularly fatigue and treatment-related pancytopenia), active chemotherapy was held starting in mid-January 2017 (and she will now hopefully enjoy a prolonged remission). As her disease did respond so well, she was referred to radiation oncology to consider PCI (prophylactic cranial irradiation); and she underwent a two week course of this therapy in February 2017. We will see her back in our clinic in ~5-6 more weeks with repeat CT scans to re-evaluate the status of her disease, as previously planned.  2. Folliculitis: Several, very small ulcerations (~2-5 mm in size at most) were noted on the top of the patient's head and bilateral shoulders. She was counseled on the importance of refraining from scratching them. She was also advised to use prn benadryl and antibiotic creams. A Rx for PO hydroxyzine was also provided. As her counts recover from her recent treatment with multiple cycles of chemotherapy and a two week course of PCI, these lesions should resolve. Continue to monitor.  3. Head/sinus congestion: Rxs for Augmentin and Claritin D both provided today. Continue to monitor.  4. Pain:  At least in part due to issue #1. She discontinued long-acting Morphine as it caused her to itch. Continue scheduled Oxycontin and prn Percocet. Refills of  both provided today. Continue to monitor.  5. Anxiety: Recently exacerbated by her diagnosis with issue #1. Continue Xanax 1 mg TID prn. Refill provided today.  The patient and her boyfriend were in agreement with these plans.    It is a pleasure to participate in Ms. Cat's care. Please do not hesitate to call with any questions or concerns that you may have.    A total of 30 minutes were spent coordinating this patient’s care in clinic today; 25 minutes of which were face-to-face with the patient and her boyfriend, reviewing her interim medical history and counseling on the current treatment and followup plan.  All questions were answered to their satisfaction.    FOLLOW UP  Multiple new and refills of Rxs provided today (see above). Return to our clinic in 5-6 weeks with a CBC, CMP and CTs of the chest, abdomen and pelvis with contrast, as previously planned.        This document was electronically signed by JACKEI Bolaños MD March 3, 2017 12:33 PM      CC: MD Yeyo Dinh MD, PhD

## 2017-04-03 RX ORDER — HYDROXYZINE HYDROCHLORIDE 25 MG/1
25 TABLET, FILM COATED ORAL EVERY 6 HOURS PRN
Qty: 60 TABLET | Refills: 3 | Status: SHIPPED | OUTPATIENT
Start: 2017-04-03

## 2017-04-03 RX ORDER — OXYCODONE HYDROCHLORIDE 15 MG/1
15 TABLET, FILM COATED, EXTENDED RELEASE ORAL EVERY 12 HOURS
Qty: 60 TABLET | Refills: 0 | Status: SHIPPED | OUTPATIENT
Start: 2017-04-03 | End: 2017-05-02 | Stop reason: SDUPTHER

## 2017-04-03 RX ORDER — OXYCODONE AND ACETAMINOPHEN 10; 325 MG/1; MG/1
1 TABLET ORAL EVERY 4 HOURS PRN
Qty: 180 TABLET | Refills: 0 | Status: SHIPPED | OUTPATIENT
Start: 2017-04-03 | End: 2017-05-02 | Stop reason: SDUPTHER

## 2017-04-03 RX ORDER — ALPRAZOLAM 1 MG/1
1 TABLET ORAL 3 TIMES DAILY PRN
Qty: 90 TABLET | Refills: 0 | Status: SHIPPED | OUTPATIENT
Start: 2017-04-03 | End: 2017-05-02 | Stop reason: SDUPTHER

## 2017-04-19 ENCOUNTER — OFFICE VISIT (OUTPATIENT)
Dept: ONCOLOGY | Facility: CLINIC | Age: 52
End: 2017-04-19

## 2017-04-19 ENCOUNTER — HOSPITAL ENCOUNTER (OUTPATIENT)
Dept: CT IMAGING | Facility: HOSPITAL | Age: 52
Discharge: HOME | End: 2017-04-19
Attending: INTERNAL MEDICINE

## 2017-04-19 ENCOUNTER — LAB (OUTPATIENT)
Dept: ONCOLOGY | Facility: CLINIC | Age: 52
End: 2017-04-19

## 2017-04-19 ENCOUNTER — INFUSION (OUTPATIENT)
Dept: ONCOLOGY | Facility: HOSPITAL | Age: 52
End: 2017-04-19

## 2017-04-19 VITALS
OXYGEN SATURATION: 97 % | RESPIRATION RATE: 18 BRPM | DIASTOLIC BLOOD PRESSURE: 88 MMHG | BODY MASS INDEX: 24.78 KG/M2 | DIASTOLIC BLOOD PRESSURE: 88 MMHG | TEMPERATURE: 96.9 F | OXYGEN SATURATION: 97 % | WEIGHT: 163 LBS | HEART RATE: 78 BPM | TEMPERATURE: 96.9 F | SYSTOLIC BLOOD PRESSURE: 139 MMHG | SYSTOLIC BLOOD PRESSURE: 139 MMHG | BODY MASS INDEX: 24.78 KG/M2 | HEART RATE: 78 BPM | RESPIRATION RATE: 18 BRPM | WEIGHT: 163 LBS

## 2017-04-19 DIAGNOSIS — C34.31 MALIGNANT NEOPLASM OF LOWER LOBE OF RIGHT LUNG (HCC): ICD-10-CM

## 2017-04-19 DIAGNOSIS — C34.31 MALIGNANT NEOPLASM OF LOWER LOBE OF RIGHT LUNG (HCC): Primary | ICD-10-CM

## 2017-04-19 LAB
ALBUMIN SERPL-MCNC: 4.3 G/DL (ref 3.5–5)
ALBUMIN/GLOB SERPL: 1.5 G/DL (ref 1.5–2.5)
ALP SERPL-CCNC: 60 U/L (ref 35–104)
ALT SERPL W P-5'-P-CCNC: 13 U/L (ref 10–36)
ANION GAP SERPL CALCULATED.3IONS-SCNC: 6.4 MMOL/L (ref 3.6–11.2)
AST SERPL-CCNC: 24 U/L (ref 10–30)
BASOPHILS # BLD AUTO: 0.03 10*3/MM3 (ref 0–0.3)
BASOPHILS NFR BLD AUTO: 0.6 % (ref 0–2)
BILIRUB SERPL-MCNC: 0.3 MG/DL (ref 0.2–1.8)
BUN BLD-MCNC: 10 MG/DL (ref 7–21)
BUN/CREAT SERPL: 12.3 (ref 7–25)
CALCIUM SPEC-SCNC: 9.6 MG/DL (ref 7.7–10)
CHLORIDE SERPL-SCNC: 104 MMOL/L (ref 99–112)
CO2 SERPL-SCNC: 27.6 MMOL/L (ref 24.3–31.9)
CREAT BLD-MCNC: 0.81 MG/DL (ref 0.43–1.29)
DEPRECATED RDW RBC AUTO: 50.1 FL (ref 37–54)
EOSINOPHIL # BLD AUTO: 0.4 10*3/MM3 (ref 0–0.7)
EOSINOPHIL NFR BLD AUTO: 8 % (ref 0–5)
ERYTHROCYTE [DISTWIDTH] IN BLOOD BY AUTOMATED COUNT: 14.8 % (ref 11.5–14.5)
GFR SERPL CREATININE-BSD FRML MDRD: 74 ML/MIN/1.73
GLOBULIN UR ELPH-MCNC: 2.8 GM/DL
GLUCOSE BLD-MCNC: 89 MG/DL (ref 70–110)
HCT VFR BLD AUTO: 43 % (ref 37–47)
HGB BLD-MCNC: 13.4 G/DL (ref 12–16)
IMM GRANULOCYTES # BLD: 0.01 10*3/MM3 (ref 0–0.03)
IMM GRANULOCYTES NFR BLD: 0.2 % (ref 0–0.5)
LYMPHOCYTES # BLD AUTO: 1.06 10*3/MM3 (ref 1–3)
LYMPHOCYTES NFR BLD AUTO: 21.2 % (ref 21–51)
MCH RBC QN AUTO: 30.2 PG (ref 27–33)
MCHC RBC AUTO-ENTMCNC: 31.2 G/DL (ref 33–37)
MCV RBC AUTO: 97.1 FL (ref 80–94)
MONOCYTES # BLD AUTO: 0.35 10*3/MM3 (ref 0.1–0.9)
MONOCYTES NFR BLD AUTO: 7 % (ref 0–10)
NEUTROPHILS # BLD AUTO: 3.16 10*3/MM3 (ref 1.4–6.5)
NEUTROPHILS NFR BLD AUTO: 63 % (ref 30–70)
OSMOLALITY SERPL CALC.SUM OF ELEC: 274.2 MOSM/KG (ref 273–305)
PLATELET # BLD AUTO: 197 10*3/MM3 (ref 130–400)
PMV BLD AUTO: 10.9 FL (ref 6–10)
POTASSIUM BLD-SCNC: 4 MMOL/L (ref 3.5–5.3)
PROT SERPL-MCNC: 7.1 G/DL (ref 6–8)
RBC # BLD AUTO: 4.43 10*6/MM3 (ref 4.2–5.4)
SODIUM BLD-SCNC: 138 MMOL/L (ref 135–153)
WBC NRBC COR # BLD: 5.01 10*3/MM3 (ref 4.5–12.5)

## 2017-04-19 PROCEDURE — 25010000002 HEPARIN FLUSH (PORCINE) 100 UNIT/ML SOLUTION: Performed by: INTERNAL MEDICINE

## 2017-04-19 PROCEDURE — 74177 CT ABD & PELVIS W/CONTRAST: CPT

## 2017-04-19 PROCEDURE — 99214 OFFICE O/P EST MOD 30 MIN: CPT | Performed by: INTERNAL MEDICINE

## 2017-04-19 PROCEDURE — 71260 CT THORAX DX C+: CPT | Performed by: RADIOLOGY

## 2017-04-19 PROCEDURE — 85025 COMPLETE CBC W/AUTO DIFF WBC: CPT | Performed by: INTERNAL MEDICINE

## 2017-04-19 PROCEDURE — 80053 COMPREHEN METABOLIC PANEL: CPT | Performed by: INTERNAL MEDICINE

## 2017-04-19 PROCEDURE — 82565 ASSAY OF CREATININE: CPT

## 2017-04-19 PROCEDURE — 74177 CT ABD & PELVIS W/CONTRAST: CPT | Performed by: RADIOLOGY

## 2017-04-19 PROCEDURE — 36591 DRAW BLOOD OFF VENOUS DEVICE: CPT

## 2017-04-19 PROCEDURE — 0 IOPAMIDOL 61 % SOLUTION: Performed by: INTERNAL MEDICINE

## 2017-04-19 PROCEDURE — 71260 CT THORAX DX C+: CPT

## 2017-04-19 RX ORDER — HEPARIN SODIUM (PORCINE) LOCK FLUSH IV SOLN 100 UNIT/ML 100 UNIT/ML
300 SOLUTION INTRAVENOUS ONCE
Status: CANCELLED | OUTPATIENT
Start: 2017-04-19

## 2017-04-19 RX ORDER — SODIUM CHLORIDE 0.9 % (FLUSH) 0.9 %
10 SYRINGE (ML) INJECTION AS NEEDED
Status: CANCELLED | OUTPATIENT
Start: 2017-05-31

## 2017-04-19 RX ORDER — LIDOCAINE AND PRILOCAINE 25; 25 MG/G; MG/G
CREAM TOPICAL AS NEEDED
Status: CANCELLED | OUTPATIENT
Start: 2017-04-19

## 2017-04-19 RX ORDER — SODIUM CHLORIDE 0.9 % (FLUSH) 0.9 %
10 SYRINGE (ML) INJECTION AS NEEDED
Status: DISCONTINUED | OUTPATIENT
Start: 2017-04-19 | End: 2017-04-19 | Stop reason: HOSPADM

## 2017-04-19 RX ADMIN — HEPARIN SODIUM (PORCINE) LOCK FLUSH IV SOLN 100 UNIT/ML 500 UNITS: 100 SOLUTION at 10:05

## 2017-04-19 RX ADMIN — Medication 10 ML: at 10:05

## 2017-04-19 RX ADMIN — IOPAMIDOL 100 ML: 612 INJECTION, SOLUTION INTRAVENOUS at 10:15

## 2017-04-19 NOTE — PROGRESS NOTES
Name:  Kylie Cat  :  1965  Date:  2017     REFERRING PHYSICIAN  Srinivasa Foreman MD    PRIMARY CARE PHYSICIAN  Srinivasa Foreman MD    REASON FOR FOLLOWUP  1. Malignant neoplasm of lower lobe of right lung      CHIEF COMPLAINT  None.    Dear Dr. Foreman,    HISTORY OF PRESENT ILLNESS:   I saw Ms. Cat in follow up today in our medical oncology clinic. As you are aware, she is a pleasant, 52 y.o., white female with a history of hypertension, COPD and chronic tobacco abuse who, in early summer 2016, as part of a workup for possible open-heart surgery, was found to have a right lower lobe lung mass (along with mediastinal and bilateral hilar adenopathy) on preop imaging.  A bronchoscopy with EBUS was performed in mid-2016, and the results were consistent with small cell carcinoma. She was referred to our clinic for further evaluation and management; and, at the time of her initial appointment with us (on 2016), arrangements were made to have a powerport placed and begin systemic chemotherapy with platinum/etoposide. Although, per patient preference, the start of her treatment was delayed a couple of times, she ultimately began this therapy by early 2016 and received a total of six cycles between then and mid-2017, overall tolerating it very well.    INTERIM HISTORY:  Ms. Cat returns to clinic today for followup again accompanied by her boyfriend. She continues to have baseline shortness of breath, but this is still not any worse than usual. She also continues to have intermittent chest/flank pains and remains anxious over her cancer diagnosis (these symptoms are also stable). She underwent a two week course of PCI (prophylactic cranial irradiation) in mid-2017 and overall tolerated it well.    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   • Stroke        Past Surgical History:   Procedure Laterality Date   • ABDOMINAL SURGERY     • APPENDECTOMY     • BREAST SURGERY      right cyst removal    • CARDIAC CATHETERIZATION     • HYSTERECTOMY     • LAPAROSCOPIC LYSIS OF ADHESIONS     • LAPAROSCOPIC SALPINGOOPHERECTOMY Right    • LAPAROSCOPIC TUBAL LIGATION     • OVARIAN CYST REMOVAL Right     right breast and right ovary    • POLYPECTOMY      (throat)   • PORTACATH PLACEMENT N/A 8/11/2016    Procedure: INSERTION OF PORTACATH;  Surgeon: Nilson Day MD;  Location: [facility];  Service:    • SINUS SURGERY     • TYMPANOPLASTY         Social History     Social History   • Marital status:      Spouse name: N/A   • Number of children: N/A   • Years of education: N/A     Occupational History   • Not on file.     Social History Main Topics   • Smoking status: Current Every Day Smoker     Packs/day: 1.00     Years: 42.00     Types: Cigarettes   • Smokeless tobacco: Not on file   • Alcohol use Yes      Comment: occ   • Drug use: Yes     Special: Marijuana   • Sexual activity: Defer     Other Topics Concern   • Not on file     Social History Narrative     FAMILY HISTORY  A few smoking-related malignancies in the family.    Allergies   Allergen Reactions   • Ciprofloxacin Hcl Anaphylaxis   • Decadron [Dexamethasone] Anaphylaxis   • Darvon [Propoxyphene] Hives   • Latex    • Keflex [Cephalexin] Other (See Comments)   • Morphine And Related Rash     Reported by patient and her spouse        Current Outpatient Prescriptions   Medication Sig Dispense Refill   • acetaminophen (TYLENOL) 500 MG tablet Take  500 mg by mouth as needed for mild pain (1-3).     • ALPRAZolam (XANAX) 1 MG tablet Take 1 tablet by mouth 3 Times a Day As Needed for anxiety. 90 tablet 0   • amLODIPine (NORVASC) 10 MG tablet Take 10 mg by mouth daily.     • amoxicillin-clavulanate (AUGMENTIN) 875-125 MG per tablet Take 1 tablet by mouth 2 (Two) Times a Day. 20 tablet 1   • aspirin 81 MG EC tablet Take 81 mg by mouth daily.     • atorvastatin (LIPITOR) 80 MG tablet Take 80 mg by mouth daily.     • benzonatate (TESSALON) 200 MG capsule Take 1 capsule by mouth 3 (Three) Times a Day As Needed for cough. 90 capsule 1   • bisoprolol (ZEBETA) 10 MG tablet Take 10 mg by mouth daily.     • cetirizine (ZyrTEC) 10 MG tablet Take 10 mg by mouth daily.     • clopidogrel (PLAVIX) 75 MG tablet Take 75 mg by mouth daily.     • clotrimazole-betamethasone (LOTRISONE) 1-0.05 % cream Apply  topically 2 (two) times a day. 45 gm apply to affected area 3 times per day     • diphenhydrAMINE (BENADRYL) 25 mg capsule Take 25 mg by mouth every 4 (four) hours as needed for itching.     • docusate sodium (COLACE) 100 MG capsule Take 1 capsule by mouth 2 (Two) Times a Day. 60 capsule 2   • fenofibrate 160 MG tablet Take 134 mg by mouth daily.     • fluconazole (DIFLUCAN) 200 MG tablet 2 tab by mouth today then one tab daily for 9 days 11 tablet 0   • Fluticasone Furoate-Vilanterol (BREO ELLIPTA IN) Inhale.     • gabapentin (NEURONTIN) 800 MG tablet Take 800 mg by mouth daily.     • hydrOXYzine (ATARAX) 25 MG tablet Take 1 tablet by mouth Every 6 (Six) Hours As Needed for Itching. 60 tablet 3   • ipratropium-albuterol (DUO-NEB) 0.5-2.5 mg/mL nebulizer Take 3 mL by nebulization every 4 (four) hours as needed for wheezing.     • lidocaine-prilocaine (EMLA) 2.5-2.5 % cream Apply  topically as needed for mild pain (1-3). Apply to port site approximately 1 hour prior to procedure. 30 g 5   • loratadine (CLARITIN) 10 MG tablet Take 10 mg by mouth daily.     •  loratadine-pseudoephedrine (CLARITIN-D 12 HOUR) 5-120 MG per 12 hr tablet Take 1 tablet by mouth 2 (Two) Times a Day As Needed for allergies. 30 tablet 0   • nitroglycerin (NITRODUR) 0.2 MG/HR patch Place 1 patch on the skin daily.     • omeprazole (PriLOSEC) 40 MG capsule Take 40 mg by mouth daily.     • ondansetron ODT (ZOFRAN ODT) 8 MG disintegrating tablet Dissolve 1 tablet on the Tongue Every 8 (Eight) Hours As Needed for nausea or vomiting. 30 tablet 5   • oxyCODONE ER (oxyCONTIN) 15 MG tablet extended-release 12 hour Take 1 tablet by mouth Every 12 (Twelve) Hours. 60 tablet 0   • oxyCODONE-acetaminophen (PERCOCET)  MG per tablet Take 1 tablet by mouth Every 4 Hours As Needed for moderate pain 180 tablet 0   • pantoprazole (PROTONIX) 40 MG EC tablet Take 40 mg by mouth daily.     • potassium citrate (UROCIT-K) 10 MEQ (1080 MG) CR tablet Take 10 mEq by mouth 3 (three) times a day with meals.     • prochlorperazine (COMPAZINE) 10 MG tablet Take 1 tablet by mouth every 6 (six) hours as needed for nausea or vomiting. 30 tablet 5   • promethazine (PHENERGAN) 25 MG tablet Take 1 tablet by mouth Every 8 (Eight) Hours As Needed for nausea or vomiting. 60 tablet 1   • QUEtiapine (SEROquel) 50 MG tablet Take 50 mg by mouth every night.       No current facility-administered medications for this visit.        REVIEW OF SYSTEMS  CONSTITUTIONAL:  No fever, chills or night sweats. Persistent fatigue, somewhat better since stopping chemotherapy in mid-January 2017.  EYES:  No blurry vision, diplopia or other vision changes.  ENT:  No hearing loss or nosebleeds. Head/sinus congestion for the past couple of weeks, with occasional yellowish nose drainage.  CARDIOVASCULAR:  No palpitations, arrhythmia, syncopal episodes or edema.  PULMONARY:  No hemoptysis or wheezing.  Baseline shortness of breath, as per the HPI above.  GASTROINTESTINAL:  No constipation or diarrhea.  No abdominal pain. Nausea/occasional vomiting for a  few days following each cycle of chemotherapy, none recently.  GENITOURINARY:  No hematuria, kidney stones or frequent urination.  MUSCULOSKELETAL:  Some chronic joint and back pains, stable. Intermittent chest/flank pains, stable, as per the HPI above.  INTEGUMENTARY: No rashes.  ENDOCRINE:  No excessive thirst or hot flashes.  HEMATOLOGIC:  No history of free bleeding, spontaneous bleeding or clotting.  IMMUNOLOGIC:  No allergies or frequent infections.  NEUROLOGIC: No numbness, tingling, seizures or weakness.  PSYCHIATRIC:  No depression. Chronic anxiety over her cancer diagnosis, stable.    PHYSICAL EXAMINATION    /88  Pulse 78  Temp 96.9 °F (36.1 °C) (Oral)   Resp 18  Wt 163 lb (73.9 kg)  SpO2 97%  BMI 24.78 kg/m2    GENERAL:  A well-developed, well-nourished, thin, white female in no acute distress.  HEENT:  Extraocular muscles intact. Positive alopecia.  CARDIOVASCULAR:  Regular rate and rhythm.  No murmurs, gallops or rubs.  LUNGS:  Clear to auscultation bilaterally.  ABDOMEN:  Soft, nontender, nondistended with positive bowel sounds.  EXTREMITIES:  No clubbing, cyanosis or edema bilaterally.  SKIN:  No rashes or petechiae.  NEURO:  Cranial nerves grossly intact.  No focal deficits.  PSYCH:  Alert and oriented x3.    LABORATORY    Lab Results   Component Value Date    WBC 5.01 04/19/2017    HGB 13.4 04/19/2017    HCT 43.0 04/19/2017    MCV 97.1 (H) 04/19/2017     04/19/2017    NEUTROABS 3.16 04/19/2017       Lab Results   Component Value Date     04/19/2017    K 4.0 04/19/2017     04/19/2017    CO2 27.6 04/19/2017    BUN 10 04/19/2017    CREATININE 0.81 04/19/2017    GLUCOSE 89 04/19/2017    CALCIUM 9.6 04/19/2017    AST 24 04/19/2017    ALT 13 04/19/2017    ALKPHOS 60 04/19/2017    BILITOT 0.3 04/19/2017    PROTEINTOT 7.1 04/19/2017    ALBUMIN 4.30 04/19/2017       IMAGING  NM PET with fusion CT, skull base to mid-thigh (06/10/2016):  Impression:  2.2 cm metabolically active  mass in the medial aspect of the superior segment right lower lobe, most consistent with primary lung cancer. There is a right hilar metabolically active lymph node, consistent with metastatic disease. There are also metabolically active lymph nodes in the left aspect of the mediastinum and left hilum, consistent with metastatic disease. In addition, there is a linear density in the medial aspect of the left lung apex with some surrounding ground-glass opacity that is moderately metabolically active.  Based on the CT appearance this is favored to be infectious or inflammatory.    NM PET with fusion CT, skull base to mid-thigh (08/28/2016):  Impression: Hypermetabolic activity is noted in the mediastinum and hilar lymph nodes bilaterally. There are hypermetabolic lung nodules in the left upper lobe and right lung base. These measure approximately 15 mm in diameter. There is no evidence of distant metastatic disease below the diaphragm or in the neck or supraclavicular area.    MRI brain with and without contrast (10/07/2016):  Impression:  1) There are no contrast-enhancing abnormalities.  2) Few areas of increased T2 and FLAIR signal. Most notable is in the left aspect of the andrez, likely representing a focus of subacute ischemia.  3) No mass hemorrhage or midline shift.    CT chest with contrast (10/18/2016):  Impression: Previously identified left upper lobe spiculated nodular mass is no longer seen. A right upper lobe spiculated pulmonary nodule today measures 1.6 cm and was 2.0 cm. Mediastinal lymphadenopathy is no longer identified.    CT abdomen and pelvis with contrast (10/18/2016):  Impression: Unremarkable exam. No source of the patient's symptoms.    CT chest with contrast (11/29/2016):  Impression: There has been some interval development of left lower lobe lung collapse with possible underlying postobstructive pneumonia. The etiology is unclear, but this could be posttreatment related or potentially  "recurrent disease or mucus plugging. A previously mentioned right upper lobe pulmonary nodule that was 1.6 cm now measures 1.3 \"mm\".    CT abdomen and pelvis with contrast (11/29/2016):  Impression: Stable evaluation of the abdomen.    CT chest with contrast (01/17/2017):  Impression: Spiculated nodule in the right lower lobe is stable (~1.2 cm in size).    CT abdomen and pelvis with contrast (01/17/2017):  Impression:  1) Evidence of artherosclerotic vascular disease and mild dilatation of the upper abdominal aorta.  2) Large volume stool in the colon.  3) No evidence of metastatic disease to the abdomen or pelvis.    CT chest with contrast (04/19/2017):  Impression: A right lower lobe spiculated nodule measuring 2.2 cm that was 1 cm.    CT abdomen and pelvis with contrast (04/19/2017):  Impression: Unremarkable exam.    PATHOLOGY  Fine needle aspiration, AP window (smears and cell block):  Positive for malignancy, small cell carcinoma.    IMPRESSION AND PLAN  Ms. Cat is a 52 y.o., white female with:  1. Small cell lung carcinoma:  Diagnosed in June/July 2016 with a right lower lobe lung mass and mediastinal/bilateral hilar involvement and confirmed via EBUS on 07/13/2016. A subsequent PET scan showed evidence of left upper lung involvement as well. I have had multiple long discussions with the patient, her daughter and/or her boyfriend since then regarding this diagnosis and its prognosis. They are aware that this disease is not classically curable; but that, particularly given her good performance status, it was (and remains) treatable and sustained remissions are possible. Given her extensive stage disease (with bilateral lung involvement) and cardiac history, the benefits of utilizing (at least initially) any localized thoracic radiation as part of her therapy were felt to not be likely to outweigh the risks. Therefore, she was agreeable to systemic chemotherapy (starting in September 2016), received a " total of six (6) cycles of carboplatin and etoposide between then and mid-January 2017 and overall tolerated them very well. With this therapy, repeat CT scans from 10/18/2016, 11/29/2016 and 01/17/2017 (all summarized above) confirmed a very good, ongoing response in her disease. Given this good response and progressive side effects (particularly fatigue and treatment-related pancytopenia), active chemotherapy was held starting in mid-January 2017 (and she has been enjoying a prolonged remission). As her disease did respond so well, she was referred to radiation oncology to consider PCI (prophylactic cranial irradiation); and she completed (and tolerated well) a two week course of this therapy in February 2017. Now, unfortunately, the most recent repeat CT scans (from this morning, 04/19/2017) show that the (likely primary) right lower lobe nodule is clearly reprogressing. However, as this area appears to be the only active site of recurrent cancer, it may be possible to pursue localized radiation alone to this limited area and avoid restarting chemotherapy at this time. We will refer her back to radiation oncology for an opinion in this matter.  2. Head/sinus congestion: Rxs for Augmentin and Claritin D previously provided and symptoms currently improved. Continue to monitor.  3. Pain:  At least in part due to issue #1. She discontinued long-acting Morphine as it caused her to itch. Continue scheduled Oxycontin and prn Percocet. Refills of both will be due in early May 2017. Continue to monitor.  4. Anxiety: Recently exacerbated by her diagnosis with issue #1. Continue Xanax 1 mg TID prn. Refill will be due in early May 2017.  The patient and her boyfriend were in agreement with these plans.    It is a pleasure to participate in Ms. Cat's care. Please do not hesitate to call with any questions or concerns that you may have.    A total of 30 minutes were spent coordinating this patient’s care in clinic today;  25 minutes of which were face-to-face with the patient and her boyfriend, reviewing her interim medical history, discussing the results of this morning's repeat CT scans and counseling on the current evaluation, treatment and followup plan. All questions were answered to their satisfaction.    FOLLOW UP  With radiation oncology to consider localized XRT to spiculated, right lower lobe lung nodule. Return to our clinic in 6 weeks with a CBC and CMP.        This document was electronically signed by JACKIE Bolaños MD April 19, 2017 4:38 PM      CC: MD Yeyo Dinh MD, PhD

## 2017-05-02 LAB — CREAT BLDA-MCNC: 0.9 MG/DL (ref 0.6–1.3)

## 2017-05-02 RX ORDER — OXYCODONE HYDROCHLORIDE 15 MG/1
15 TABLET, FILM COATED, EXTENDED RELEASE ORAL EVERY 12 HOURS
Qty: 60 TABLET | Refills: 0 | Status: SHIPPED | OUTPATIENT
Start: 2017-05-02 | End: 2017-05-31 | Stop reason: SDUPTHER

## 2017-05-02 RX ORDER — OXYCODONE AND ACETAMINOPHEN 10; 325 MG/1; MG/1
1 TABLET ORAL EVERY 4 HOURS PRN
Qty: 180 TABLET | Refills: 0 | Status: SHIPPED | OUTPATIENT
Start: 2017-05-02 | End: 2017-05-31 | Stop reason: SDUPTHER

## 2017-05-02 RX ORDER — ALPRAZOLAM 1 MG/1
1 TABLET ORAL 3 TIMES DAILY PRN
Qty: 90 TABLET | Refills: 0 | Status: SHIPPED | OUTPATIENT
Start: 2017-05-02 | End: 2017-05-31 | Stop reason: SDUPTHER

## 2017-05-31 ENCOUNTER — OFFICE VISIT (OUTPATIENT)
Dept: ONCOLOGY | Facility: CLINIC | Age: 52
End: 2017-05-31

## 2017-05-31 ENCOUNTER — LAB (OUTPATIENT)
Dept: ONCOLOGY | Facility: CLINIC | Age: 52
End: 2017-05-31

## 2017-05-31 ENCOUNTER — INFUSION (OUTPATIENT)
Dept: ONCOLOGY | Facility: HOSPITAL | Age: 52
End: 2017-05-31

## 2017-05-31 ENCOUNTER — DOCUMENTATION (OUTPATIENT)
Dept: ONCOLOGY | Facility: HOSPITAL | Age: 52
End: 2017-05-31

## 2017-05-31 VITALS
HEART RATE: 67 BPM | RESPIRATION RATE: 20 BRPM | DIASTOLIC BLOOD PRESSURE: 69 MMHG | WEIGHT: 159.5 LBS | TEMPERATURE: 97.1 F | SYSTOLIC BLOOD PRESSURE: 104 MMHG | OXYGEN SATURATION: 98 % | BODY MASS INDEX: 24.25 KG/M2

## 2017-05-31 VITALS
RESPIRATION RATE: 20 BRPM | TEMPERATURE: 97.1 F | DIASTOLIC BLOOD PRESSURE: 69 MMHG | SYSTOLIC BLOOD PRESSURE: 104 MMHG | WEIGHT: 159.5 LBS | BODY MASS INDEX: 24.25 KG/M2 | HEART RATE: 67 BPM | OXYGEN SATURATION: 98 %

## 2017-05-31 DIAGNOSIS — C34.31 MALIGNANT NEOPLASM OF LOWER LOBE OF RIGHT LUNG (HCC): ICD-10-CM

## 2017-05-31 DIAGNOSIS — C34.31 MALIGNANT NEOPLASM OF LOWER LOBE OF RIGHT LUNG (HCC): Primary | ICD-10-CM

## 2017-05-31 LAB
25(OH)D3 SERPL-MCNC: 8 NG/ML
ALBUMIN SERPL-MCNC: 3.9 G/DL (ref 3.5–5)
ALBUMIN/GLOB SERPL: 1.4 G/DL (ref 1.5–2.5)
ALP SERPL-CCNC: 69 U/L (ref 35–104)
ALT SERPL W P-5'-P-CCNC: 12 U/L (ref 10–36)
ANION GAP SERPL CALCULATED.3IONS-SCNC: 4.1 MMOL/L (ref 3.6–11.2)
AST SERPL-CCNC: 24 U/L (ref 10–30)
BASOPHILS # BLD AUTO: 0.03 10*3/MM3 (ref 0–0.3)
BASOPHILS NFR BLD AUTO: 0.5 % (ref 0–2)
BILIRUB SERPL-MCNC: 0.4 MG/DL (ref 0.2–1.8)
BUN BLD-MCNC: 9 MG/DL (ref 7–21)
BUN/CREAT SERPL: 11.3 (ref 7–25)
CALCIUM SPEC-SCNC: 9.3 MG/DL (ref 7.7–10)
CHLORIDE SERPL-SCNC: 111 MMOL/L (ref 99–112)
CO2 SERPL-SCNC: 30.9 MMOL/L (ref 24.3–31.9)
CREAT BLD-MCNC: 0.8 MG/DL (ref 0.43–1.29)
DEPRECATED RDW RBC AUTO: 50.7 FL (ref 37–54)
EOSINOPHIL # BLD AUTO: 0.34 10*3/MM3 (ref 0–0.7)
EOSINOPHIL NFR BLD AUTO: 5.7 % (ref 0–5)
ERYTHROCYTE [DISTWIDTH] IN BLOOD BY AUTOMATED COUNT: 14.6 % (ref 11.5–14.5)
GFR SERPL CREATININE-BSD FRML MDRD: 75 ML/MIN/1.73
GLOBULIN UR ELPH-MCNC: 2.8 GM/DL
GLUCOSE BLD-MCNC: 94 MG/DL (ref 70–110)
HCT VFR BLD AUTO: 42.1 % (ref 37–47)
HGB BLD-MCNC: 13.4 G/DL (ref 12–16)
IMM GRANULOCYTES # BLD: 0.01 10*3/MM3 (ref 0–0.03)
IMM GRANULOCYTES NFR BLD: 0.2 % (ref 0–0.5)
LYMPHOCYTES # BLD AUTO: 1.27 10*3/MM3 (ref 1–3)
LYMPHOCYTES NFR BLD AUTO: 21.2 % (ref 21–51)
MCH RBC QN AUTO: 30.2 PG (ref 27–33)
MCHC RBC AUTO-ENTMCNC: 31.8 G/DL (ref 33–37)
MCV RBC AUTO: 95 FL (ref 80–94)
MONOCYTES # BLD AUTO: 0.46 10*3/MM3 (ref 0.1–0.9)
MONOCYTES NFR BLD AUTO: 7.7 % (ref 0–10)
NEUTROPHILS # BLD AUTO: 3.89 10*3/MM3 (ref 1.4–6.5)
NEUTROPHILS NFR BLD AUTO: 64.7 % (ref 30–70)
OSMOLALITY SERPL CALC.SUM OF ELEC: 289 MOSM/KG (ref 273–305)
PLATELET # BLD AUTO: 211 10*3/MM3 (ref 130–400)
PMV BLD AUTO: 11.6 FL (ref 6–10)
POTASSIUM BLD-SCNC: 3.6 MMOL/L (ref 3.5–5.3)
PROT SERPL-MCNC: 6.7 G/DL (ref 6–8)
RBC # BLD AUTO: 4.43 10*6/MM3 (ref 4.2–5.4)
SODIUM BLD-SCNC: 146 MMOL/L (ref 135–153)
VIT B12 BLD-MCNC: 218 PG/ML (ref 211–911)
WBC NRBC COR # BLD: 6 10*3/MM3 (ref 4.5–12.5)

## 2017-05-31 PROCEDURE — 99214 OFFICE O/P EST MOD 30 MIN: CPT | Performed by: INTERNAL MEDICINE

## 2017-05-31 PROCEDURE — 25010000002 HEPARIN FLUSH (PORCINE) 100 UNIT/ML SOLUTION: Performed by: INTERNAL MEDICINE

## 2017-05-31 PROCEDURE — 36591 DRAW BLOOD OFF VENOUS DEVICE: CPT

## 2017-05-31 RX ORDER — SODIUM CHLORIDE 0.9 % (FLUSH) 0.9 %
10 SYRINGE (ML) INJECTION AS NEEDED
Status: DISCONTINUED | OUTPATIENT
Start: 2017-05-31 | End: 2017-05-31 | Stop reason: HOSPADM

## 2017-05-31 RX ORDER — ALPRAZOLAM 1 MG/1
1 TABLET ORAL 3 TIMES DAILY PRN
Qty: 90 TABLET | Refills: 0 | Status: SHIPPED | OUTPATIENT
Start: 2017-05-31 | End: 2017-06-29 | Stop reason: SDUPTHER

## 2017-05-31 RX ORDER — OXYCODONE HYDROCHLORIDE 15 MG/1
15 TABLET, FILM COATED, EXTENDED RELEASE ORAL EVERY 12 HOURS
Qty: 60 TABLET | Refills: 0 | Status: SHIPPED | OUTPATIENT
Start: 2017-05-31 | End: 2017-06-29 | Stop reason: SDUPTHER

## 2017-05-31 RX ORDER — OXYCODONE AND ACETAMINOPHEN 10; 325 MG/1; MG/1
1 TABLET ORAL EVERY 4 HOURS PRN
Qty: 180 TABLET | Refills: 0 | Status: SHIPPED | OUTPATIENT
Start: 2017-05-31 | End: 2017-06-29 | Stop reason: SDUPTHER

## 2017-05-31 RX ORDER — SODIUM CHLORIDE 0.9 % (FLUSH) 0.9 %
10 SYRINGE (ML) INJECTION AS NEEDED
Status: CANCELLED | OUTPATIENT
Start: 2017-05-31

## 2017-05-31 RX ORDER — LIDOCAINE AND PRILOCAINE 25; 25 MG/G; MG/G
CREAM TOPICAL AS NEEDED
Status: CANCELLED | OUTPATIENT
Start: 2017-05-31

## 2017-05-31 RX ORDER — HEPARIN SODIUM (PORCINE) LOCK FLUSH IV SOLN 100 UNIT/ML 100 UNIT/ML
300 SOLUTION INTRAVENOUS ONCE
Status: CANCELLED | OUTPATIENT
Start: 2017-05-31

## 2017-05-31 RX ADMIN — SODIUM CHLORIDE, PRESERVATIVE FREE 500 UNITS: 5 INJECTION INTRAVENOUS at 11:00

## 2017-05-31 RX ADMIN — Medication 10 ML: at 11:00

## 2017-06-05 ENCOUNTER — TRANSCRIBE ORDERS (OUTPATIENT)
Dept: ADMINISTRATIVE | Facility: HOSPITAL | Age: 52
End: 2017-06-05

## 2017-06-05 ENCOUNTER — HOSPITAL ENCOUNTER (EMERGENCY)
Dept: HOSPITAL 79 - ER1 | Age: 52
Discharge: HOME | End: 2017-06-05
Payer: COMMERCIAL

## 2017-06-05 DIAGNOSIS — F17.200: ICD-10-CM

## 2017-06-05 DIAGNOSIS — M54.5: ICD-10-CM

## 2017-06-05 DIAGNOSIS — G89.29: ICD-10-CM

## 2017-06-05 DIAGNOSIS — Z88.1: ICD-10-CM

## 2017-06-05 DIAGNOSIS — C34.90: ICD-10-CM

## 2017-06-05 DIAGNOSIS — C34.31 SQUAMOUS CELL CARCINOMA OF BRONCHUS IN RIGHT LOWER LOBE (HCC): Primary | ICD-10-CM

## 2017-06-05 DIAGNOSIS — R20.2 PARESTHESIA: ICD-10-CM

## 2017-06-05 DIAGNOSIS — R51: Primary | ICD-10-CM

## 2017-06-09 ENCOUNTER — HOSPITAL ENCOUNTER (OUTPATIENT)
Dept: MRI IMAGING | Facility: HOSPITAL | Age: 52
Discharge: HOME | End: 2017-06-09
Attending: RADIOLOGY | Admitting: RADIOLOGY

## 2017-06-09 DIAGNOSIS — C34.31 SQUAMOUS CELL CARCINOMA OF BRONCHUS IN RIGHT LOWER LOBE (HCC): ICD-10-CM

## 2017-06-09 PROCEDURE — 70553 MRI BRAIN STEM W/O & W/DYE: CPT | Performed by: RADIOLOGY

## 2017-06-09 PROCEDURE — 70553 MRI BRAIN STEM W/O & W/DYE: CPT

## 2017-06-09 PROCEDURE — A9577 INJ MULTIHANCE: HCPCS | Performed by: RADIOLOGY

## 2017-06-09 PROCEDURE — 0 GADOBENATE DIMEGLUMINE 529 MG/ML SOLUTION: Performed by: RADIOLOGY

## 2017-06-09 RX ADMIN — GADOBENATE DIMEGLUMINE 15 ML: 529 INJECTION, SOLUTION INTRAVENOUS at 10:15

## 2017-06-13 ENCOUNTER — APPOINTMENT (OUTPATIENT)
Dept: MRI IMAGING | Facility: HOSPITAL | Age: 52
End: 2017-06-13
Attending: RADIOLOGY

## 2017-06-13 ENCOUNTER — HOSPITAL ENCOUNTER (OUTPATIENT)
Dept: MRI IMAGING | Facility: HOSPITAL | Age: 52
Discharge: HOME | End: 2017-06-13
Attending: RADIOLOGY | Admitting: RADIOLOGY

## 2017-06-13 ENCOUNTER — HOSPITAL ENCOUNTER (OUTPATIENT)
Dept: MRI IMAGING | Facility: HOSPITAL | Age: 52
Discharge: HOME | End: 2017-06-13
Attending: RADIOLOGY

## 2017-06-13 DIAGNOSIS — R20.2 PARESTHESIA: ICD-10-CM

## 2017-06-13 DIAGNOSIS — C34.31 SQUAMOUS CELL CARCINOMA OF BRONCHUS IN RIGHT LOWER LOBE (HCC): ICD-10-CM

## 2017-06-13 PROCEDURE — 72158 MRI LUMBAR SPINE W/O & W/DYE: CPT

## 2017-06-13 PROCEDURE — 72158 MRI LUMBAR SPINE W/O & W/DYE: CPT | Performed by: RADIOLOGY

## 2017-06-13 PROCEDURE — 72157 MRI CHEST SPINE W/O & W/DYE: CPT | Performed by: RADIOLOGY

## 2017-06-13 PROCEDURE — A9577 INJ MULTIHANCE: HCPCS | Performed by: RADIOLOGY

## 2017-06-13 PROCEDURE — 0 GADOBENATE DIMEGLUMINE 529 MG/ML SOLUTION: Performed by: RADIOLOGY

## 2017-06-13 PROCEDURE — 72157 MRI CHEST SPINE W/O & W/DYE: CPT

## 2017-06-13 RX ADMIN — GADOBENATE DIMEGLUMINE 14 ML: 529 INJECTION, SOLUTION INTRAVENOUS at 11:18

## 2017-06-29 RX ORDER — OXYCODONE HYDROCHLORIDE 15 MG/1
15 TABLET, FILM COATED, EXTENDED RELEASE ORAL EVERY 12 HOURS
Qty: 60 TABLET | Refills: 0 | Status: SHIPPED | OUTPATIENT
Start: 2017-06-29 | End: 2017-07-26 | Stop reason: SDUPTHER

## 2017-06-29 RX ORDER — OXYCODONE AND ACETAMINOPHEN 10; 325 MG/1; MG/1
1 TABLET ORAL EVERY 4 HOURS PRN
Qty: 180 TABLET | Refills: 0 | Status: SHIPPED | OUTPATIENT
Start: 2017-06-29 | End: 2017-07-26 | Stop reason: SDUPTHER

## 2017-06-29 RX ORDER — ALPRAZOLAM 1 MG/1
1 TABLET ORAL 3 TIMES DAILY PRN
Qty: 90 TABLET | Refills: 0 | Status: SHIPPED | OUTPATIENT
Start: 2017-06-29 | End: 2017-07-26 | Stop reason: SDUPTHER

## 2017-07-06 ENCOUNTER — INFUSION (OUTPATIENT)
Dept: ONCOLOGY | Facility: HOSPITAL | Age: 52
End: 2017-07-06

## 2017-07-06 VITALS
OXYGEN SATURATION: 98 % | WEIGHT: 160 LBS | DIASTOLIC BLOOD PRESSURE: 77 MMHG | SYSTOLIC BLOOD PRESSURE: 112 MMHG | RESPIRATION RATE: 20 BRPM | BODY MASS INDEX: 24.33 KG/M2 | TEMPERATURE: 97.3 F | HEART RATE: 76 BPM

## 2017-07-06 DIAGNOSIS — C34.31 MALIGNANT NEOPLASM OF LOWER LOBE OF RIGHT LUNG (HCC): Primary | ICD-10-CM

## 2017-07-06 PROCEDURE — 96360 HYDRATION IV INFUSION INIT: CPT

## 2017-07-06 PROCEDURE — 25010000002 HEPARIN FLUSH (PORCINE) 100 UNIT/ML SOLUTION: Performed by: INTERNAL MEDICINE

## 2017-07-06 RX ORDER — LIDOCAINE AND PRILOCAINE 25; 25 MG/G; MG/G
CREAM TOPICAL AS NEEDED
Status: CANCELLED | OUTPATIENT
Start: 2017-07-06

## 2017-07-06 RX ORDER — SODIUM CHLORIDE 0.9 % (FLUSH) 0.9 %
10 SYRINGE (ML) INJECTION AS NEEDED
Status: CANCELLED | OUTPATIENT
Start: 2017-08-16

## 2017-07-06 RX ORDER — HEPARIN SODIUM (PORCINE) LOCK FLUSH IV SOLN 100 UNIT/ML 100 UNIT/ML
300 SOLUTION INTRAVENOUS ONCE
Status: CANCELLED | OUTPATIENT
Start: 2017-07-06

## 2017-07-06 RX ORDER — SODIUM CHLORIDE 9 MG/ML
INJECTION, SOLUTION INTRAVENOUS
Status: COMPLETED
Start: 2017-07-06 | End: 2017-07-06

## 2017-07-06 RX ORDER — SODIUM CHLORIDE 9 MG/ML
1000 INJECTION, SOLUTION INTRAVENOUS ONCE
Status: DISCONTINUED | OUTPATIENT
Start: 2017-07-06 | End: 2017-07-06 | Stop reason: HOSPADM

## 2017-07-06 RX ADMIN — SODIUM CHLORIDE 1000 ML: 9 INJECTION, SOLUTION INTRAVENOUS at 10:45

## 2017-07-06 RX ADMIN — HEPARIN SODIUM (PORCINE) LOCK FLUSH IV SOLN 100 UNIT/ML 500 UNITS: 100 SOLUTION at 12:00

## 2017-07-06 NOTE — CODE DOCUMENTATION
11 AM Patient is doing much better at this time, more alert and oriented.  Spoke with patient and spouse about spacing medications out more instead of giving pain medication and nerve medication at the same time.  Encouraged spouse if he was having problems adjusting med times to bring in patients meds and we would work on a medication log.  Spouse verbalized understanding.  I discussed at length with the spouse the reason her blood pressure was low and she was hard to arouse and speech was slurred at time of arrival was probably  due to her medications/medications being given too close together time wise.  He verbalized understanding but denied assistance with medication log at this time.

## 2017-07-12 ENCOUNTER — APPOINTMENT (OUTPATIENT)
Dept: ONCOLOGY | Facility: HOSPITAL | Age: 52
End: 2017-07-12

## 2017-07-26 RX ORDER — OXYCODONE AND ACETAMINOPHEN 10; 325 MG/1; MG/1
1 TABLET ORAL EVERY 4 HOURS PRN
Qty: 180 TABLET | Refills: 0 | Status: SHIPPED | OUTPATIENT
Start: 2017-07-26 | End: 2017-08-22 | Stop reason: SDUPTHER

## 2017-07-26 RX ORDER — OXYCODONE HYDROCHLORIDE 15 MG/1
15 TABLET, FILM COATED, EXTENDED RELEASE ORAL EVERY 12 HOURS
Qty: 60 TABLET | Refills: 0 | Status: SHIPPED | OUTPATIENT
Start: 2017-07-26 | End: 2017-08-22 | Stop reason: SDUPTHER

## 2017-07-26 RX ORDER — ALPRAZOLAM 1 MG/1
1 TABLET ORAL 3 TIMES DAILY PRN
Qty: 90 TABLET | Refills: 0 | Status: SHIPPED | OUTPATIENT
Start: 2017-07-26 | End: 2017-08-22 | Stop reason: SDUPTHER

## 2017-07-26 NOTE — TELEPHONE ENCOUNTER
We had an inbox message requesting a pain and nerve medication refill for the patient. I have talked to Dr. Bolaños and got the scripts signed for the refills of her oxycontin, percocet, and her xanax. I have called the patient to let her know that the scripts for her refills are available for pickup at our . She has verbalized understanding.

## 2017-08-22 ENCOUNTER — TELEPHONE (OUTPATIENT)
Dept: ONCOLOGY | Facility: HOSPITAL | Age: 52
End: 2017-08-22

## 2017-08-22 RX ORDER — OXYCODONE AND ACETAMINOPHEN 10; 325 MG/1; MG/1
1 TABLET ORAL EVERY 4 HOURS PRN
Qty: 180 TABLET | Refills: 0 | Status: SHIPPED | OUTPATIENT
Start: 2017-08-22 | End: 2017-09-18 | Stop reason: SDUPTHER

## 2017-08-22 RX ORDER — ALPRAZOLAM 1 MG/1
1 TABLET ORAL 3 TIMES DAILY PRN
Qty: 90 TABLET | Refills: 0 | Status: SHIPPED | OUTPATIENT
Start: 2017-08-22 | End: 2017-09-18 | Stop reason: SDUPTHER

## 2017-08-22 RX ORDER — OXYCODONE HYDROCHLORIDE 15 MG/1
15 TABLET, FILM COATED, EXTENDED RELEASE ORAL EVERY 12 HOURS
Qty: 60 TABLET | Refills: 0 | Status: SHIPPED | OUTPATIENT
Start: 2017-08-22 | End: 2017-09-18 | Stop reason: SDUPTHER

## 2017-08-22 NOTE — TELEPHONE ENCOUNTER
Called back to pt's daughter, Yuli, to let her know prescriptions for were ready for pickup for pt.  Per Yuli, she stated that pt's primary caregiver, her , (Yuli's step-father), was currently recovering in the nursing home with multiple health issues, and Kylie has been at home by herself when Yuli is working.  Yuli had questions of trying to have HH set up for her mother.  I informed Yuli that our current SW was out of the office, but I would inform her of Yuli's concerns.  I would have SW check in with Yuli when she arrives back in the office.  I encouraged her to check with patient's PCP office to see if they could help arrange some home care, in the meantime.  Yuli verbalized understanding and is in agreement with plan.       In-basket message sent to KARLENE Andrews this am.

## 2017-08-22 NOTE — TELEPHONE ENCOUNTER
----- Message from JACKIE Bolaños MD sent at 8/22/2017  9:51 AM EDT -----  Regarding: RE: REFILL REQUEST  Contact: [phone]  Sure. thanks    ----- Message -----     From: Divine Romero RN     Sent: 8/22/2017   9:48 AM       To: JACKIE Bolaños MD  Subject: FW: REFILL REQUEST                               Are you ok with us filling these early?  It all falls as 28 days since the previous fill on all three... Oxycontin, Xanax, & Percocet.       ----- Message -----     From: Aurora Roberts     Sent: 8/22/2017   9:39 AM       To: [facility]fusion Clinical Pool  Subject: REFILL REQUEST                                   The patient's daughter called.  Kylie was admitted to [facility] through the ER a couple weeks ago, transferred to [facility] in [place] and discharged from there last night.  Her daughter (Yuli) states that somewhere between the ER and Rehab, Kylie's bag of meds went missing.  She is going through Kylie's PCP for her regular meds and wanted to know if there is any way possible she could get her pain meds and nerve meds filled a couple days early.  It looks like they were last filled on 7/26.  Please call Yuli and let her know one way or the other.

## 2017-08-29 ENCOUNTER — DOCUMENTATION (OUTPATIENT)
Dept: ONCOLOGY | Facility: HOSPITAL | Age: 52
End: 2017-08-29

## 2017-08-29 NOTE — PROGRESS NOTES
SS received in basket per nsg requesting for SS to contact pt's daughter.  SS contacted pt's daughter Yuli Cat [phone].  Daughter states that she is having a difficult time managing pt's pain medication and nerve medication and doesn't really know how or what to do.  Daughter states that pt's spouse, daughter's step father Jude Casper is being sent to [place] today for surgery.  Daughter concerned about taking her mom with her to [place] and providing care for her while at the hospital.  Daughter states that pt was admitted to [facility] in [place] but was released due to insurance.  Daughter states that pt's medication is locked in the safe but patient keeps saying that daughter is letting her be in pain and keeping her medicine from her.  Daughter states that pt is a recovering drug addict of twenty years and daughter uncertain to patient's pain level or using pain as an excuse to take pain medicine.  Daughter states that patient has scans scheduled for Friday and then will have a follow up appointment with Dr. Bolaños on Tuesday.  Daughter states that she hopes to hilda answers on Tuesday regarding what type of care patient needs.  Daughter states that if pt's cancer has progressed then she is considering hospice.  Daughter states that she wants the decision to be made by the doctor based on results of her scans.  SS informed daughter that SS will assist with needs as needed.

## 2017-09-05 ENCOUNTER — LAB (OUTPATIENT)
Dept: ONCOLOGY | Facility: CLINIC | Age: 52
End: 2017-09-05

## 2017-09-05 ENCOUNTER — INFUSION (OUTPATIENT)
Dept: ONCOLOGY | Facility: HOSPITAL | Age: 52
End: 2017-09-05

## 2017-09-05 ENCOUNTER — DOCUMENTATION (OUTPATIENT)
Dept: ONCOLOGY | Facility: HOSPITAL | Age: 52
End: 2017-09-05

## 2017-09-05 ENCOUNTER — OFFICE VISIT (OUTPATIENT)
Dept: ONCOLOGY | Facility: CLINIC | Age: 52
End: 2017-09-05

## 2017-09-05 VITALS
SYSTOLIC BLOOD PRESSURE: 110 MMHG | RESPIRATION RATE: 18 BRPM | OXYGEN SATURATION: 98 % | DIASTOLIC BLOOD PRESSURE: 76 MMHG | BODY MASS INDEX: 22.62 KG/M2 | TEMPERATURE: 98.3 F | HEART RATE: 129 BPM | WEIGHT: 148.8 LBS

## 2017-09-05 VITALS
WEIGHT: 148.8 LBS | SYSTOLIC BLOOD PRESSURE: 110 MMHG | TEMPERATURE: 98.3 F | BODY MASS INDEX: 22.62 KG/M2 | HEART RATE: 129 BPM | OXYGEN SATURATION: 98 % | DIASTOLIC BLOOD PRESSURE: 76 MMHG | RESPIRATION RATE: 18 BRPM

## 2017-09-05 DIAGNOSIS — R52 UNCONTROLLED PAIN: ICD-10-CM

## 2017-09-05 DIAGNOSIS — C34.31 MALIGNANT NEOPLASM OF LOWER LOBE OF RIGHT LUNG (HCC): ICD-10-CM

## 2017-09-05 DIAGNOSIS — C34.31 MALIGNANT NEOPLASM OF LOWER LOBE OF RIGHT LUNG (HCC): Primary | ICD-10-CM

## 2017-09-05 DIAGNOSIS — F41.9 ANXIETY: ICD-10-CM

## 2017-09-05 LAB
ALBUMIN SERPL-MCNC: 3.9 G/DL (ref 3.5–5)
ALBUMIN/GLOB SERPL: 1.4 G/DL (ref 1.5–2.5)
ALP SERPL-CCNC: 91 U/L (ref 35–104)
ALT SERPL W P-5'-P-CCNC: 15 U/L (ref 10–36)
ANION GAP SERPL CALCULATED.3IONS-SCNC: 4.7 MMOL/L (ref 3.6–11.2)
AST SERPL-CCNC: 21 U/L (ref 10–30)
BASOPHILS # BLD AUTO: 0.03 10*3/MM3 (ref 0–0.3)
BASOPHILS NFR BLD AUTO: 0.5 % (ref 0–2)
BILIRUB SERPL-MCNC: 0.5 MG/DL (ref 0.2–1.8)
BUN BLD-MCNC: 10 MG/DL (ref 7–21)
BUN/CREAT SERPL: 13.2 (ref 7–25)
CALCIUM SPEC-SCNC: 9.3 MG/DL (ref 7.7–10)
CHLORIDE SERPL-SCNC: 108 MMOL/L (ref 99–112)
CO2 SERPL-SCNC: 29.3 MMOL/L (ref 24.3–31.9)
CREAT BLD-MCNC: 0.76 MG/DL (ref 0.43–1.29)
DEPRECATED RDW RBC AUTO: 49 FL (ref 37–54)
EOSINOPHIL # BLD AUTO: 0.24 10*3/MM3 (ref 0–0.7)
EOSINOPHIL NFR BLD AUTO: 4.3 % (ref 0–5)
ERYTHROCYTE [DISTWIDTH] IN BLOOD BY AUTOMATED COUNT: 14.9 % (ref 11.5–14.5)
GFR SERPL CREATININE-BSD FRML MDRD: 79 ML/MIN/1.73
GLOBULIN UR ELPH-MCNC: 2.7 GM/DL
GLUCOSE BLD-MCNC: 101 MG/DL (ref 70–110)
HCT VFR BLD AUTO: 37.2 % (ref 37–47)
HGB BLD-MCNC: 11.7 G/DL (ref 12–16)
IMM GRANULOCYTES # BLD: 0.03 10*3/MM3 (ref 0–0.03)
IMM GRANULOCYTES NFR BLD: 0.5 % (ref 0–0.5)
LYMPHOCYTES # BLD AUTO: 0.53 10*3/MM3 (ref 1–3)
LYMPHOCYTES NFR BLD AUTO: 9.4 % (ref 21–51)
MCH RBC QN AUTO: 30.6 PG (ref 27–33)
MCHC RBC AUTO-ENTMCNC: 31.5 G/DL (ref 33–37)
MCV RBC AUTO: 97.4 FL (ref 80–94)
MONOCYTES # BLD AUTO: 0.37 10*3/MM3 (ref 0.1–0.9)
MONOCYTES NFR BLD AUTO: 6.6 % (ref 0–10)
NEUTROPHILS # BLD AUTO: 4.44 10*3/MM3 (ref 1.4–6.5)
NEUTROPHILS NFR BLD AUTO: 78.7 % (ref 30–70)
OSMOLALITY SERPL CALC.SUM OF ELEC: 282.3 MOSM/KG (ref 273–305)
PLATELET # BLD AUTO: 229 10*3/MM3 (ref 130–400)
PMV BLD AUTO: 10.6 FL (ref 6–10)
POTASSIUM BLD-SCNC: 3.9 MMOL/L (ref 3.5–5.3)
PROT SERPL-MCNC: 6.6 G/DL (ref 6–8)
RBC # BLD AUTO: 3.82 10*6/MM3 (ref 4.2–5.4)
SODIUM BLD-SCNC: 142 MMOL/L (ref 135–153)
WBC NRBC COR # BLD: 5.64 10*3/MM3 (ref 4.5–12.5)

## 2017-09-05 PROCEDURE — 25010000002 HEPARIN FLUSH (PORCINE) 100 UNIT/ML SOLUTION: Performed by: INTERNAL MEDICINE

## 2017-09-05 PROCEDURE — 99214 OFFICE O/P EST MOD 30 MIN: CPT | Performed by: NURSE PRACTITIONER

## 2017-09-05 PROCEDURE — 36591 DRAW BLOOD OFF VENOUS DEVICE: CPT

## 2017-09-05 RX ORDER — LIDOCAINE AND PRILOCAINE 25; 25 MG/G; MG/G
CREAM TOPICAL AS NEEDED
OUTPATIENT
Start: 2017-09-05

## 2017-09-05 RX ORDER — HEPARIN SODIUM (PORCINE) LOCK FLUSH IV SOLN 100 UNIT/ML 100 UNIT/ML
300 SOLUTION INTRAVENOUS ONCE
OUTPATIENT
Start: 2017-09-05

## 2017-09-05 RX ORDER — SODIUM CHLORIDE 0.9 % (FLUSH) 0.9 %
10 SYRINGE (ML) INJECTION AS NEEDED
OUTPATIENT
Start: 2017-09-05

## 2017-09-05 RX ORDER — SODIUM CHLORIDE 0.9 % (FLUSH) 0.9 %
10 SYRINGE (ML) INJECTION AS NEEDED
Status: DISCONTINUED | OUTPATIENT
Start: 2017-09-05 | End: 2017-09-05 | Stop reason: HOSPADM

## 2017-09-05 RX ORDER — HEPARIN SODIUM (PORCINE) LOCK FLUSH IV SOLN 100 UNIT/ML 100 UNIT/ML
500 SOLUTION INTRAVENOUS AS NEEDED
OUTPATIENT
Start: 2017-09-05

## 2017-09-05 RX ADMIN — Medication 10 ML: at 11:10

## 2017-09-05 RX ADMIN — SODIUM CHLORIDE, PRESERVATIVE FREE 500 UNITS: 5 INJECTION INTRAVENOUS at 11:10

## 2017-09-05 NOTE — PROGRESS NOTES
Name:  Kylie Cat  :  1965  Date:  2017     REFERRING PHYSICIAN  Srinivasa Foreman MD    PRIMARY CARE PHYSICIAN  Srinivasa Foreman MD    REASON FOR FOLLOWUP  1. Malignant neoplasm of lower lobe of right lung      CHIEF COMPLAINT  Worsening fatigue, weakness, pain, dizziness, anxiety    Dear Dr. Foreman,    HISTORY OF PRESENT ILLNESS:   I saw Ms. Cat in follow up today in our medical oncology clinic. As you are aware, she is a pleasant, 52 y.o., white female with a history of hypertension, COPD and chronic tobacco abuse who, in early summer 2016, as part of a workup for possible open-heart surgery, was found to have a right lower lobe lung mass (along with mediastinal and bilateral hilar adenopathy) on preop imaging.  A bronchoscopy with EBUS was performed in mid-2016, and the results were consistent with small cell carcinoma. She was referred to our clinic for further evaluation and management; and, at the time of her initial appointment with us (on 2016), arrangements were made to have a powerport placed and begin systemic chemotherapy with platinum/etoposide. Although, per patient preference, the start of her treatment was delayed a couple of times, she ultimately began this therapy by early 2016 and received a total of six cycles between then and mid-2017, overall tolerating it very well.    INTERIM HISTORY:  Ms. Cat returns to clinic today for followup, accompanied by her daughter. Since her last visit, she completed 30 localized radiation treatments to her recurrent, right lower lobe lung nodule. She also underwent a two week course of PCI (prophylactic cranial irradiation) in mid-2017 and overall tolerated it well.She was scheduled to have repeat scans prior to follow up. However, patient's daughter reports that her step-dad passed away last week and this is why the patient missed her scans. She is complaining of worsening pain and anxiety. Patient  reports having daily headaches which radiate into her neck, pain in her mid-back, BLE and chest. She reports she is currently taking oxycontin ER 15 mg PO every 12 hours along with percocet 10/325, 1 tab every 4 hours. She also reports anxiety is not controlled with xanax 1 mg PO TID and has been taking this 4 times daily since her  passed away. Patient's daughter reports she has been taking care of her at home and she has been having fatigue, weakness, dizziness and unsteady gait with recent falls. She is requesting home health referral to help care for her at home. Patient continues to smoke 1 PPD and reports having worsening SOB, no cough. She also reports decreased appetite and weight loss.     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   • Stroke        Past Surgical History:   Procedure Laterality Date   • ABDOMINAL SURGERY     • APPENDECTOMY     • BREAST SURGERY      right cyst removal    • CARDIAC CATHETERIZATION     • HYSTERECTOMY     • LAPAROSCOPIC LYSIS OF ADHESIONS     • LAPAROSCOPIC SALPINGOOPHERECTOMY Right    • LAPAROSCOPIC TUBAL LIGATION     • OVARIAN CYST REMOVAL Right     right breast and right ovary    • POLYPECTOMY      (throat)   • PORTACATH PLACEMENT N/A 8/11/2016    Procedure: INSERTION OF PORTACATH;  Surgeon: Nilson Day MD;  Location: [facility];  Service:    • SINUS SURGERY     • TYMPANOPLASTY         Social History     Social History   • Marital status:      Spouse name: N/A   • Number of  children: N/A   • Years of education: N/A     Occupational History   • Not on file.     Social History Main Topics   • Smoking status: Current Every Day Smoker     Packs/day: 1.00     Years: 42.00     Types: Cigarettes   • Smokeless tobacco: Not on file   • Alcohol use Yes      Comment: occ   • Drug use: Yes     Special: Marijuana   • Sexual activity: Defer     Other Topics Concern   • Not on file     Social History Narrative     FAMILY HISTORY  A few smoking-related malignancies in the family.    Allergies   Allergen Reactions   • Ciprofloxacin Hcl Anaphylaxis   • Decadron [Dexamethasone] Anaphylaxis   • Darvon [Propoxyphene] Hives   • Latex    • Keflex [Cephalexin] Other (See Comments)   • Morphine And Related Rash     Reported by patient and her spouse        Current Outpatient Prescriptions   Medication Sig Dispense Refill   • acetaminophen (TYLENOL) 500 MG tablet Take 500 mg by mouth as needed for mild pain (1-3).     • ALPRAZolam (XANAX) 1 MG tablet Take 1 tablet by mouth 3 Times a Day As Needed for anxiety. 90 tablet 0   • amLODIPine (NORVASC) 10 MG tablet Take 10 mg by mouth daily.     • aspirin 81 MG EC tablet Take 81 mg by mouth daily.     • atorvastatin (LIPITOR) 80 MG tablet Take 80 mg by mouth daily.     • benzonatate (TESSALON) 200 MG capsule Take 1 capsule by mouth 3 (Three) Times a Day As Needed for cough. 90 capsule 1   • bisoprolol (ZEBETA) 10 MG tablet Take 10 mg by mouth daily.     • cetirizine (ZyrTEC) 10 MG tablet Take 10 mg by mouth daily.     • clopidogrel (PLAVIX) 75 MG tablet Take 75 mg by mouth daily.     • clotrimazole-betamethasone (LOTRISONE) 1-0.05 % cream Apply  topically 2 (two) times a day. 45 gm apply to affected area 3 times per day     • diphenhydrAMINE (BENADRYL) 25 mg capsule Take 25 mg by mouth every 4 (four) hours as needed for itching.     • docusate sodium (COLACE) 100 MG capsule Take 1 capsule by mouth 2 (Two) Times a Day. 60 capsule 2   • fenofibrate 160 MG tablet Take  134 mg by mouth daily.     • fluconazole (DIFLUCAN) 200 MG tablet 2 tab by mouth today then one tab daily for 9 days 11 tablet 0   • Fluticasone Furoate-Vilanterol (BREO ELLIPTA IN) Inhale.     • gabapentin (NEURONTIN) 800 MG tablet Take 800 mg by mouth daily.     • hydrOXYzine (ATARAX) 25 MG tablet Take 1 tablet by mouth Every 6 (Six) Hours As Needed for Itching. 60 tablet 3   • ipratropium-albuterol (DUO-NEB) 0.5-2.5 mg/mL nebulizer Take 3 mL by nebulization every 4 (four) hours as needed for wheezing.     • lidocaine-prilocaine (EMLA) 2.5-2.5 % cream Apply  topically as needed for mild pain (1-3). Apply to port site approximately 1 hour prior to procedure. 30 g 5   • loratadine (CLARITIN) 10 MG tablet Take 10 mg by mouth daily.     • loratadine-pseudoephedrine (CLARITIN-D 12 HOUR) 5-120 MG per 12 hr tablet Take 1 tablet by mouth 2 (Two) Times a Day As Needed for allergies. 30 tablet 0   • nitroglycerin (NITRODUR) 0.2 MG/HR patch Place 1 patch on the skin daily.     • omeprazole (PriLOSEC) 40 MG capsule Take 40 mg by mouth daily.     • ondansetron ODT (ZOFRAN ODT) 8 MG disintegrating tablet Dissolve 1 tablet on the Tongue Every 8 (Eight) Hours As Needed for nausea or vomiting. 30 tablet 5   • oxyCODONE ER (oxyCONTIN) 15 MG tablet extended-release 12 hour Take 1 tablet by mouth Every 12 (Twelve) Hours. 60 tablet 0   • oxyCODONE-acetaminophen (PERCOCET)  MG per tablet Take 1 tablet by mouth Every 4 Hours As Needed for moderate pain 180 tablet 0   • pantoprazole (PROTONIX) 40 MG EC tablet Take 40 mg by mouth daily.     • potassium citrate (UROCIT-K) 10 MEQ (1080 MG) CR tablet Take 10 mEq by mouth 3 (three) times a day with meals.     • prochlorperazine (COMPAZINE) 10 MG tablet Take 1 tablet by mouth every 6 (six) hours as needed for nausea or vomiting. 30 tablet 5   • promethazine (PHENERGAN) 25 MG tablet Take 1 tablet by mouth Every 8 (Eight) Hours As Needed for nausea or vomiting. 60 tablet 1   • QUEtiapine  (SEROquel) 50 MG tablet Take 50 mg by mouth every night.       No current facility-administered medications for this visit.      Facility-Administered Medications Ordered in Other Visits   Medication Dose Route Frequency Provider Last Rate Last Dose   • heparin flush (porcine) 100 UNIT/ML injection 500 Units  500 Units Intracatheter PRN T Fred Bolaños MD       • sodium chloride 0.9 % flush 10 mL  10 mL Intravenous PRN T Fred Bolaños MD           REVIEW OF SYSTEMS  CONSTITUTIONAL:  No fever, chills or night sweats. Worsening fatigue and weakness.   EYES:  No blurry vision, diplopia or other vision changes.  ENT:  No hearing loss or nosebleeds. Nasal congestion resolved.   CARDIOVASCULAR:  No palpitations, arrhythmia, syncopal episodes or edema. +BLE edema  PULMONARY:  No hemoptysis or wheezing. Worsening SOB, no cough  GASTROINTESTINAL:  No constipation or diarrhea.  No abdominal pain. No nausea or vomiting.   GENITOURINARY:  No hematuria, kidney stones or frequent urination.  MUSCULOSKELETAL:  Chronic joint and back pains. Intermittent chest/flank pains as per the HPI above.  INTEGUMENTARY: No rashes.  ENDOCRINE:  No excessive thirst or hot flashes.  HEMATOLOGIC:  No history of free bleeding, spontaneous bleeding or clotting.  IMMUNOLOGIC:  No allergies or frequent infections.  NEUROLOGIC: No numbness, tingling, seizures or weakness.  PSYCHIATRIC:  No depression. Chronic anxiety, now worsening with recent death of .     PHYSICAL EXAMINATION    /76  Pulse (!) 129  Temp 98.3 °F (36.8 °C) (Oral)   Resp 18  Wt 148 lb 12.8 oz (67.5 kg)  SpO2 98%  BMI 22.62 kg/m2  GENERAL:  A well-developed, well-nourished, thin, white female in no acute distress.   HEENT:  Extraocular muscles intact. Positive alopecia.  CARDIOVASCULAR:  Regular rate and rhythm.  No murmurs, gallops or rubs.  LUNGS:  Clear to auscultation bilaterally.  ABDOMEN:  Soft, nontender, nondistended with positive bowel sounds.  EXTREMITIES:   No clubbing, cyanosis or edema bilaterally.  SKIN:  No rashes or petechiae.  NEURO:  Cranial nerves grossly intact.  No focal deficits.  PSYCH:  Alert and oriented x3.    LABORATORY    Lab Results   Component Value Date    WBC 5.64 09/05/2017    HGB 11.7 (L) 09/05/2017    HCT 37.2 09/05/2017    MCV 97.4 (H) 09/05/2017     09/05/2017    NEUTROABS 4.44 09/05/2017       Lab Results   Component Value Date     09/05/2017    K 3.9 09/05/2017     09/05/2017    CO2 29.3 09/05/2017    BUN 10 09/05/2017    CREATININE 0.76 09/05/2017    GLUCOSE 101 09/05/2017    CALCIUM 9.3 09/05/2017    AST 21 09/05/2017    ALT 15 09/05/2017    ALKPHOS 91 09/05/2017    BILITOT 0.5 09/05/2017    PROTEINTOT 6.6 09/05/2017    ALBUMIN 3.90 09/05/2017       IMAGING  NM PET with fusion CT, skull base to mid-thigh (06/10/2016):  Impression:  2.2 cm metabolically active mass in the medial aspect of the superior segment right lower lobe, most consistent with primary lung cancer. There is a right hilar metabolically active lymph node, consistent with metastatic disease. There are also metabolically active lymph nodes in the left aspect of the mediastinum and left hilum, consistent with metastatic disease. In addition, there is a linear density in the medial aspect of the left lung apex with some surrounding ground-glass opacity that is moderately metabolically active.  Based on the CT appearance this is favored to be infectious or inflammatory.    NM PET with fusion CT, skull base to mid-thigh (08/28/2016):  Impression: Hypermetabolic activity is noted in the mediastinum and hilar lymph nodes bilaterally. There are hypermetabolic lung nodules in the left upper lobe and right lung base. These measure approximately 15 mm in diameter. There is no evidence of distant metastatic disease below the diaphragm or in the neck or supraclavicular area.    MRI brain with and without contrast (10/07/2016):  Impression:  1) There are no  "contrast-enhancing abnormalities.  2) Few areas of increased T2 and FLAIR signal. Most notable is in the left aspect of the andrez, likely representing a focus of subacute ischemia.  3) No mass hemorrhage or midline shift.    CT chest with contrast (10/18/2016):  Impression: Previously identified left upper lobe spiculated nodular mass is no longer seen. A right upper lobe spiculated pulmonary nodule today measures 1.6 cm and was 2.0 cm. Mediastinal lymphadenopathy is no longer identified.    CT abdomen and pelvis with contrast (10/18/2016):  Impression: Unremarkable exam. No source of the patient's symptoms.    CT chest with contrast (11/29/2016):  Impression: There has been some interval development of left lower lobe lung collapse with possible underlying postobstructive pneumonia. The etiology is unclear, but this could be posttreatment related or potentially recurrent disease or mucus plugging. A previously mentioned right upper lobe pulmonary nodule that was 1.6 cm now measures 1.3 \"mm\".    CT abdomen and pelvis with contrast (11/29/2016):  Impression: Stable evaluation of the abdomen.    CT chest with contrast (01/17/2017):  Impression: Spiculated nodule in the right lower lobe is stable (~1.2 cm in size).    CT abdomen and pelvis with contrast (01/17/2017):  Impression:  1) Evidence of artherosclerotic vascular disease and mild dilatation of the upper abdominal aorta.  2) Large volume stool in the colon.  3) No evidence of metastatic disease to the abdomen or pelvis.    CT chest with contrast (04/19/2017):  Impression: A right lower lobe spiculated nodule measuring 2.2 cm that was 1 cm.    CT abdomen and pelvis with contrast (04/19/2017):  Impression: Unremarkable exam.    PATHOLOGY  Fine needle aspiration, AP window (smears and cell block):  Positive for malignancy, small cell carcinoma.    IMPRESSION AND PLAN  Ms. Cat is a 52 y.o., white female with:  1. Small cell lung carcinoma:  Diagnosed in " June/July 2016 with a right lower lobe lung mass and mediastinal/bilateral hilar involvement and confirmed via EBUS on 07/13/2016. A subsequent PET scan showed evidence of left upper lung involvement as well. I have had multiple long discussions with the patient, her daughter and/or her boyfriend since then regarding this diagnosis and its prognosis. They are aware that this disease is not classically curable; but that, particularly given her good performance status, it was (and remains) treatable and sustained remissions are possible. Given her extensive stage disease (with bilateral lung involvement) and cardiac history, the benefits of utilizing (at least initially) any localized thoracic radiation as part of her therapy were felt to not be likely to outweigh the risks. Therefore, she was agreeable to systemic chemotherapy (starting in September 2016), received a total of six (6) cycles of carboplatin and etoposide between then and mid-January 2017 and overall tolerated them very well. With this therapy, repeat CT scans from 10/18/2016, 11/29/2016 and 01/17/2017 (all summarized above) confirmed a very good, ongoing response in her disease. Given this good response and progressive side effects (particularly fatigue and treatment-related pancytopenia), active chemotherapy was held starting in mid-January 2017 (and she has been enjoying a prolonged, to date still at least partial, remission). As her disease did respond so well, she was referred to radiation oncology to consider PCI (prophylactic cranial irradiation); and she completed (and tolerated well) a two week course of this therapy in February 2017. Unfortunately, the most recent repeat CT scans (from 04/19/2017) showed the (likely primary) right lower lobe nodule clearly reprogressed. However, as this area appeared to be the only active site of recurrent cancer, she was re-referred to radiation oncology who agreed that localized radiation alone to this  limited area may benefit her. She completed 30 radiation treatments at end of July 2017. She was scheduled to have repeat scans prior to follow up, but did not have these done due to recent death in the family. Patient presents today with multiple complaints. Informed patient we will need to reschedule these scans and will also add MRI of brain for further evaluation. Will arrange follow up after repeat scans, based on findings, will discuss further management.   2. Head/sinus congestion: Resolved.  3. Pain:  At least in part due to issue #1. She discontinued long-acting Morphine as it caused her to itch. She reports pain is currently not controlled with Oxycontin ER 15 mg PO BID and prn Percocet every 4 hours. Patient was provided with refills on 8/22/17. Advised patient to continue with current regimen for now. Will review repeat scan results and adjust medications at that time if needed. Many of her current symptoms could be related to the amount of medication she is taking.   4. Anxiety: Secondary to her diagnosis with issue #1 and recently exacerbated by death of her . Currently taking Xanax 1 mg TID and reports she has been taking this four times daily. Patient has multiple complaints today including weakness, fatigue, dizziness and recent falls. Advised patient to try and wean this medication down if possible as many of these symptoms could be related to the amount of medication she is taking. In the meantime, will have repeat MRI of brain as above for further evaluation.     The patient and her daughter were in agreement with these plans.    It is a pleasure to participate in Ms. Cat's care. Please do not hesitate to call with any questions or concerns that you may have.    A total of 25 minutes were spent coordinating this patient’s care in clinic today;  More than 50% of the time was spent face-to-face with the patient and her daughter, reviewing her interim medical history and counseling on  the current treatment and followup plan. All questions were answered to their satisfaction.    FOLLOW UP  Reschedule CT of the chest, abdomen and pelvis with contrast. Will also add repeat MRI of brain. Will follow up after repeat scans with CBC and CMP. Will ask Araceli with SS to help arrange home heatlh        This document was electronically signed by RANGEL Lucas September 5, 2017 11:44 AM      CC: MD Yeyo Dinh MD, PhD

## 2017-09-05 NOTE — PROGRESS NOTES
SS received referral to arrange home health for patient and wheelchair.  SS spoke with pt and daughter Yuli.  Pt states that spouse  on Thursday and  will be this Friday, .  Pt request [facility].  SS contacted [facility] [phone] and faxed [phone] face sheet and dictation note to [facility].  SS contacted [employer] [phone] per Meryl to arrange wheelchair.  Meryl states that wheelchair will require prior authorization.  SS will follow as needed.

## 2017-09-12 ENCOUNTER — TELEPHONE (OUTPATIENT)
Dept: ONCOLOGY | Facility: HOSPITAL | Age: 52
End: 2017-09-12

## 2017-09-12 NOTE — TELEPHONE ENCOUNTER
----- Message from Aurora Roberts sent at 9/12/2017 12:04 PM EDT -----  Regarding: PLEASE CALL  Contact: [phone]  The patient left a VMM and asked for someone to please return her call.  No reason was given.
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
## 2017-09-14 ENCOUNTER — APPOINTMENT (OUTPATIENT)
Dept: MRI IMAGING | Facility: HOSPITAL | Age: 52
End: 2017-09-14

## 2017-09-15 ENCOUNTER — APPOINTMENT (OUTPATIENT)
Dept: CT IMAGING | Facility: HOSPITAL | Age: 52
End: 2017-09-15
Attending: INTERNAL MEDICINE

## 2017-09-18 ENCOUNTER — TELEPHONE (OUTPATIENT)
Dept: ONCOLOGY | Facility: HOSPITAL | Age: 52
End: 2017-09-18

## 2017-09-18 RX ORDER — ALPRAZOLAM 1 MG/1
1 TABLET ORAL 3 TIMES DAILY PRN
Qty: 90 TABLET | Refills: 0 | Status: SHIPPED | OUTPATIENT
Start: 2017-09-18 | End: 2017-10-17 | Stop reason: SDUPTHER

## 2017-09-18 RX ORDER — OXYCODONE HYDROCHLORIDE 15 MG/1
15 TABLET, FILM COATED, EXTENDED RELEASE ORAL EVERY 12 HOURS
Qty: 60 TABLET | Refills: 0 | Status: SHIPPED | OUTPATIENT
Start: 2017-09-18 | End: 2017-10-17 | Stop reason: SDUPTHER

## 2017-09-18 RX ORDER — OXYCODONE AND ACETAMINOPHEN 10; 325 MG/1; MG/1
1 TABLET ORAL EVERY 4 HOURS PRN
Qty: 180 TABLET | Refills: 0 | Status: SHIPPED | OUTPATIENT
Start: 2017-09-18 | End: 2017-10-05 | Stop reason: SDUPTHER

## 2017-09-18 NOTE — TELEPHONE ENCOUNTER
----- Message from Shirley Campos sent at 9/15/2017  3:27 PM EDT -----  Regarding: REFILL  Needs refill on her pain and nerve medication. Daughter will be by Monday to . Thank you

## 2017-09-21 ENCOUNTER — APPOINTMENT (OUTPATIENT)
Dept: MRI IMAGING | Facility: HOSPITAL | Age: 52
End: 2017-09-21

## 2017-09-26 ENCOUNTER — HOSPITAL ENCOUNTER (OUTPATIENT)
Dept: CT IMAGING | Facility: HOSPITAL | Age: 52
Discharge: HOME | End: 2017-09-26
Attending: INTERNAL MEDICINE | Admitting: INTERNAL MEDICINE

## 2017-09-26 ENCOUNTER — HOSPITAL ENCOUNTER (OUTPATIENT)
Dept: CT IMAGING | Facility: HOSPITAL | Age: 52
Discharge: HOME | End: 2017-09-26
Attending: INTERNAL MEDICINE

## 2017-09-26 DIAGNOSIS — C34.31 MALIGNANT NEOPLASM OF LOWER LOBE OF RIGHT LUNG (HCC): ICD-10-CM

## 2017-09-26 PROCEDURE — 74177 CT ABD & PELVIS W/CONTRAST: CPT | Performed by: RADIOLOGY

## 2017-09-26 PROCEDURE — 71260 CT THORAX DX C+: CPT | Performed by: RADIOLOGY

## 2017-09-26 PROCEDURE — 74177 CT ABD & PELVIS W/CONTRAST: CPT

## 2017-09-26 PROCEDURE — 0 IOPAMIDOL 61 % SOLUTION: Performed by: INTERNAL MEDICINE

## 2017-09-26 PROCEDURE — 71260 CT THORAX DX C+: CPT

## 2017-09-26 RX ADMIN — IOPAMIDOL 100 ML: 612 INJECTION, SOLUTION INTRAVENOUS at 15:15

## 2017-09-29 ENCOUNTER — APPOINTMENT (OUTPATIENT)
Dept: MRI IMAGING | Facility: HOSPITAL | Age: 52
End: 2017-09-29

## 2017-10-05 ENCOUNTER — OFFICE VISIT (OUTPATIENT)
Dept: ONCOLOGY | Facility: CLINIC | Age: 52
End: 2017-10-05

## 2017-10-05 ENCOUNTER — HOSPITAL ENCOUNTER (OUTPATIENT)
Dept: MRI IMAGING | Facility: HOSPITAL | Age: 52
Discharge: HOME | End: 2017-10-05

## 2017-10-05 VITALS
BODY MASS INDEX: 21.97 KG/M2 | WEIGHT: 144.5 LBS | HEART RATE: 67 BPM | SYSTOLIC BLOOD PRESSURE: 119 MMHG | TEMPERATURE: 97.9 F | OXYGEN SATURATION: 95 % | RESPIRATION RATE: 18 BRPM | DIASTOLIC BLOOD PRESSURE: 82 MMHG

## 2017-10-05 DIAGNOSIS — C34.31 MALIGNANT NEOPLASM OF LOWER LOBE OF RIGHT LUNG (HCC): Primary | ICD-10-CM

## 2017-10-05 DIAGNOSIS — C34.31 MALIGNANT NEOPLASM OF LOWER LOBE OF RIGHT LUNG (HCC): ICD-10-CM

## 2017-10-05 PROCEDURE — 99215 OFFICE O/P EST HI 40 MIN: CPT | Performed by: INTERNAL MEDICINE

## 2017-10-05 RX ORDER — GABAPENTIN 800 MG/1
800 TABLET ORAL DAILY
Qty: 30 TABLET | Refills: 5 | Status: SHIPPED | OUTPATIENT
Start: 2017-10-05

## 2017-10-05 RX ORDER — BENZONATATE 200 MG/1
200 CAPSULE ORAL 3 TIMES DAILY PRN
Qty: 90 CAPSULE | Refills: 1 | Status: SHIPPED | OUTPATIENT
Start: 2017-10-05

## 2017-10-05 RX ORDER — OXYCODONE AND ACETAMINOPHEN 10; 325 MG/1; MG/1
1 TABLET ORAL EVERY 4 HOURS PRN
Qty: 180 TABLET | Refills: 0 | Status: SHIPPED | OUTPATIENT
Start: 2017-10-05 | End: 2017-11-01 | Stop reason: SDUPTHER

## 2017-10-05 NOTE — PROGRESS NOTES
Name:  Kylie Cat  :  1965  Date:  10/5/2017     REFERRING PHYSICIAN  Srinivasa Foreman MD    PRIMARY CARE PHYSICIAN  Srinivasa Foreman MD    REASON FOR FOLLOWUP  1. Malignant neoplasm of lower lobe of right lung      CHIEF COMPLAINT  Chronic fatigue, pain, dizziness and anxiety.    Dear Dr. Foreman,    HISTORY OF PRESENT ILLNESS:   I saw Ms. Cat in follow up today in our medical oncology clinic. As you are aware, she is a pleasant, 52 y.o., white female with a history of hypertension, COPD and chronic tobacco abuse who, in early summer 2016, as part of a workup for possible open-heart surgery, was found to have a right lower lobe lung mass (along with mediastinal and bilateral hilar adenopathy) on preop imaging. A bronchoscopy with EBUS was performed in mid-2016, and the results were consistent with small cell carcinoma. She was referred to our clinic for further evaluation and management; and, at the time of her initial appointment with us (on 2016), arrangements were made to have a powerport placed and begin systemic chemotherapy with platinum/etoposide. Although, per patient preference, the start of her treatment was delayed a couple of times, she ultimately began this therapy by early 2016 and received a total of six cycles between then and mid-2017, overall tolerating them very well. She achieved an overall good response from this therapy and was subsequently agreeable to undergoing a two week course of PCI (prophylactic cranial irradiation). She received this treatment in mid-2017 and overall tolerated it well also. In 2017, repeat CT scans showed evidence of slight reprogression in the right lung mass; and she was agreeable to receiving localized XRT to this area alone (which she completed by the end of 2017 and also tolerated well).    INTERIM HISTORY:  Ms. Cat returns to clinic today for followup again accompanied by her daughter. Her   passed away in late August 2017 from metastatic lung cancer. Her main complaints continue to be of chronic fatigue, dizziness, pain and anxiety. She has fallen on occasion. She continues to smoke. She has not followed up again with cardiology since she was diagnosed with cancer (in summer 2016).    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   • Stroke        Past Surgical History:   Procedure Laterality Date   • ABDOMINAL SURGERY     • APPENDECTOMY     • BREAST SURGERY      right cyst removal    • CARDIAC CATHETERIZATION     • HYSTERECTOMY     • LAPAROSCOPIC LYSIS OF ADHESIONS     • LAPAROSCOPIC SALPINGOOPHERECTOMY Right    • LAPAROSCOPIC TUBAL LIGATION     • OVARIAN CYST REMOVAL Right     right breast and right ovary    • POLYPECTOMY      (throat)   • PORTACATH PLACEMENT N/A 8/11/2016    Procedure: INSERTION OF PORTACATH;  Surgeon: Nilson Day MD;  Location: [facility];  Service:    • SINUS SURGERY     • TYMPANOPLASTY         Social History     Social History   • Marital status:      Spouse name: N/A   • Number of children: N/A   • Years of education: N/A     Occupational History   • Not on file.     Social History Main Topics   • Smoking status: Current Every Day Smoker     Packs/day: 1.00     Years: 42.00     Types: Cigarettes   • Smokeless tobacco: Never Used   • Alcohol use Yes      Comment: occ   • Drug use: Yes     Special: Marijuana   • Sexual activity: Defer     Other Topics Concern   • Not on file     Social  History Narrative     FAMILY HISTORY  A few smoking-related malignancies in the family.    Allergies   Allergen Reactions   • Ciprofloxacin Hcl Anaphylaxis   • Decadron [Dexamethasone] Anaphylaxis   • Darvon [Propoxyphene] Hives   • Latex    • Keflex [Cephalexin] Other (See Comments)   • Morphine And Related Rash     Reported by patient and her spouse        Current Outpatient Prescriptions   Medication Sig Dispense Refill   • acetaminophen (TYLENOL) 500 MG tablet Take 500 mg by mouth as needed for mild pain (1-3).     • ALPRAZolam (XANAX) 1 MG tablet Take 1 tablet by mouth 3 Times a Day As Needed for anxiety. 90 tablet 0   • amLODIPine (NORVASC) 10 MG tablet Take 10 mg by mouth daily.     • aspirin 81 MG EC tablet Take 81 mg by mouth daily.     • atorvastatin (LIPITOR) 80 MG tablet Take 80 mg by mouth daily.     • benzonatate (TESSALON) 200 MG capsule Take 1 capsule by mouth 3 (Three) Times a Day As Needed for cough. 90 capsule 1   • bisoprolol (ZEBETA) 10 MG tablet Take 10 mg by mouth daily.     • cetirizine (ZyrTEC) 10 MG tablet Take 10 mg by mouth daily.     • clopidogrel (PLAVIX) 75 MG tablet Take 75 mg by mouth daily.     • clotrimazole-betamethasone (LOTRISONE) 1-0.05 % cream Apply  topically 2 (two) times a day. 45 gm apply to affected area 3 times per day     • diphenhydrAMINE (BENADRYL) 25 mg capsule Take 25 mg by mouth every 4 (four) hours as needed for itching.     • docusate sodium (COLACE) 100 MG capsule Take 1 capsule by mouth 2 (Two) Times a Day. 60 capsule 2   • fenofibrate 160 MG tablet Take 134 mg by mouth daily.     • fluconazole (DIFLUCAN) 200 MG tablet 2 tab by mouth today then one tab daily for 9 days 11 tablet 0   • Fluticasone Furoate-Vilanterol (BREO ELLIPTA IN) Inhale.     • gabapentin (NEURONTIN) 800 MG tablet Take 800 mg by mouth daily.     • hydrOXYzine (ATARAX) 25 MG tablet Take 1 tablet by mouth Every 6 (Six) Hours As Needed for Itching. 60 tablet 3   • ipratropium-albuterol (DUO-NEB)  0.5-2.5 mg/mL nebulizer Take 3 mL by nebulization every 4 (four) hours as needed for wheezing.     • lidocaine-prilocaine (EMLA) 2.5-2.5 % cream Apply  topically as needed for mild pain (1-3). Apply to port site approximately 1 hour prior to procedure. 30 g 5   • loratadine (CLARITIN) 10 MG tablet Take 10 mg by mouth daily.     • loratadine-pseudoephedrine (CLARITIN-D 12 HOUR) 5-120 MG per 12 hr tablet Take 1 tablet by mouth 2 (Two) Times a Day As Needed for allergies. 30 tablet 0   • nitroglycerin (NITRODUR) 0.2 MG/HR patch Place 1 patch on the skin daily.     • omeprazole (PriLOSEC) 40 MG capsule Take 40 mg by mouth daily.     • ondansetron ODT (ZOFRAN ODT) 8 MG disintegrating tablet Dissolve 1 tablet on the Tongue Every 8 (Eight) Hours As Needed for nausea or vomiting. 30 tablet 5   • oxyCODONE ER (oxyCONTIN) 15 MG tablet extended-release 12 hour Take 1 tablet by mouth Every 12 (Twelve) Hours. 60 tablet 0   • oxyCODONE-acetaminophen (PERCOCET)  MG per tablet Take 1 tablet by mouth Every 4 Hours As Needed for moderate pain 180 tablet 0   • pantoprazole (PROTONIX) 40 MG EC tablet Take 40 mg by mouth daily.     • potassium citrate (UROCIT-K) 10 MEQ (1080 MG) CR tablet Take 10 mEq by mouth 3 (three) times a day with meals.     • prochlorperazine (COMPAZINE) 10 MG tablet Take 1 tablet by mouth every 6 (six) hours as needed for nausea or vomiting. 30 tablet 5   • promethazine (PHENERGAN) 25 MG tablet Take 1 tablet by mouth Every 8 (Eight) Hours As Needed for nausea or vomiting. 60 tablet 1   • QUEtiapine (SEROquel) 50 MG tablet Take 50 mg by mouth every night.       No current facility-administered medications for this visit.        REVIEW OF SYSTEMS  CONSTITUTIONAL:  No fever, chills or night sweats. Chronic fatigue and weakness.   EYES:  No blurry vision, diplopia or other vision changes.  ENT:  No hearing loss or nosebleeds. Intermittent nasal congestion.  CARDIOVASCULAR:  No palpitations, arrhythmia, syncopal  episodes or edema.  PULMONARY:  No hemoptysis or wheezing. Chronic shortness of breath. Intermittent cough. Continued tobacco abuse.  GASTROINTESTINAL:  No constipation or diarrhea.  No abdominal pain. No nausea or vomiting.   GENITOURINARY:  No hematuria, kidney stones or frequent urination.  MUSCULOSKELETAL:  Chronic joint and back pains. Intermittent chest/flank pains.  INTEGUMENTARY: No rashes.  ENDOCRINE:  No excessive thirst or hot flashes.  HEMATOLOGIC:  No history of free bleeding, spontaneous bleeding or clotting.  IMMUNOLOGIC:  No allergies or frequent infections.  NEUROLOGIC: No numbness, tingling, seizures or weakness.  PSYCHIATRIC:  No depression. Chronic anxiety, now worsened with the recent death of her .     PHYSICAL EXAMINATION    /82  Pulse 67  Temp 97.9 °F (36.6 °C) (Oral)   Resp 18  Wt 144 lb 8 oz (65.5 kg)  SpO2 95%  BMI 21.97 kg/m2    GENERAL:  A well-developed, thin, white female in no acute distress, appearing older than her actual age, sitting in a wheelchair.  HEENT:  Extraocular muscles intact. Positive alopecia.  CARDIOVASCULAR:  Regular rate and rhythm. No murmurs, gallops or rubs.  LUNGS:  Clear to auscultation bilaterally.  ABDOMEN:  Soft, nontender, nondistended with positive bowel sounds.  EXTREMITIES:  No clubbing, cyanosis or edema bilaterally.  SKIN:  No rashes or petechiae.  NEURO:  Cranial nerves grossly intact.  No focal deficits.  PSYCH:  Alert and oriented x3.    LABORATORY    Lab Results   Component Value Date    WBC 5.64 09/05/2017    HGB 11.7 (L) 09/05/2017    HCT 37.2 09/05/2017    MCV 97.4 (H) 09/05/2017     09/05/2017    NEUTROABS 4.44 09/05/2017       Lab Results   Component Value Date     09/05/2017    K 3.9 09/05/2017     09/05/2017    CO2 29.3 09/05/2017    BUN 10 09/05/2017    CREATININE 0.76 09/05/2017    GLUCOSE 101 09/05/2017    CALCIUM 9.3 09/05/2017    AST 21 09/05/2017    ALT 15 09/05/2017    ALKPHOS 91 09/05/2017     BILITOT 0.5 09/05/2017    PROTEINTOT 6.6 09/05/2017    ALBUMIN 3.90 09/05/2017       IMAGING  NM PET with fusion CT, skull base to mid-thigh (06/10/2016):  Impression:  2.2 cm metabolically active mass in the medial aspect of the superior segment right lower lobe, most consistent with primary lung cancer. There is a right hilar metabolically active lymph node, consistent with metastatic disease. There are also metabolically active lymph nodes in the left aspect of the mediastinum and left hilum, consistent with metastatic disease. In addition, there is a linear density in the medial aspect of the left lung apex with some surrounding ground-glass opacity that is moderately metabolically active.  Based on the CT appearance this is favored to be infectious or inflammatory.    NM PET with fusion CT, skull base to mid-thigh (08/28/2016):  Impression: Hypermetabolic activity is noted in the mediastinum and hilar lymph nodes bilaterally. There are hypermetabolic lung nodules in the left upper lobe and right lung base. These measure approximately 15 mm in diameter. There is no evidence of distant metastatic disease below the diaphragm or in the neck or supraclavicular area.    MRI brain with and without contrast (10/07/2016):  Impression:  1) There are no contrast-enhancing abnormalities.  2) Few areas of increased T2 and FLAIR signal. Most notable is in the left aspect of the andrez, likely representing a focus of subacute ischemia.  3) No mass hemorrhage or midline shift.    CT chest with contrast (10/18/2016):  Impression: Previously identified left upper lobe spiculated nodular mass is no longer seen. A right upper lobe spiculated pulmonary nodule today measures 1.6 cm and was 2.0 cm. Mediastinal lymphadenopathy is no longer identified.    CT abdomen and pelvis with contrast (10/18/2016):  Impression: Unremarkable exam. No source of the patient's symptoms.    CT chest with contrast (11/29/2016):  Impression: There has  "been some interval development of left lower lobe lung collapse with possible underlying postobstructive pneumonia. The etiology is unclear, but this could be posttreatment related or potentially recurrent disease or mucus plugging. A previously mentioned right upper lobe pulmonary nodule that was 1.6 cm now measures 1.3 \"mm\".    CT abdomen and pelvis with contrast (11/29/2016):  Impression: Stable evaluation of the abdomen.    CT chest with contrast (01/17/2017):  Impression: Spiculated nodule in the right lower lobe is stable (~1.2 cm in size).    CT abdomen and pelvis with contrast (01/17/2017):  Impression:  1) Evidence of artherosclerotic vascular disease and mild dilatation of the upper abdominal aorta.  2) Large volume stool in the colon.  3) No evidence of metastatic disease to the abdomen or pelvis.    CT chest with contrast (04/19/2017):  Impression: A right lower lobe spiculated nodule measuring 2.2 cm that was 1 cm.    CT abdomen and pelvis with contrast (04/19/2017):  Impression: Unremarkable exam.    CT chest with contrast (09/26/2017):  Impression: Continued evidence of a mass lesion in the right lower lobe. There is what is felt to be postobstructive pneumonitis peripheral to the lesion in the lower lobe. No adenopathy has developed in the mediastinum.    CT abdomen and pelvis with contrast (09/26/2017):  Impression: Stable CT of the abdomen and pelvis, minimal fatty changes are noted in the liver. Nothing was seen to suggest development of metastatic disease. There continues to be heavy atherosclerotic change in the aorta.    PATHOLOGY  Fine needle aspiration, AP window (smears and cell block):  Positive for malignancy, small cell carcinoma.    IMPRESSION AND PLAN  Ms. Cat is a 52 y.o., white female with:  1. Small cell lung carcinoma:  Diagnosed in June/July 2016 with a right lower lobe lung mass and mediastinal/bilateral hilar involvement and confirmed via EBUS on 07/13/2016. A subsequent PET " scan showed evidence of left upper lung involvement as well. I have had multiple long discussions with the patient, her daughter and/or her  since then regarding this diagnosis and its prognosis. They are aware that this disease is not classically curable; but that, particularly given her good performance status, it was (and remains) treatable and sustained remissions are possible. Given her extensive stage disease (with bilateral lung involvement) and cardiac history, the benefits of utilizing (at least initially) any localized thoracic radiation as part of her therapy were felt to not be likely to outweigh the risks. Therefore, she was agreeable to initial systemic chemotherapy alone (starting in September 2016), received a total of six (6) cycles of carboplatin and etoposide between then and mid-January 2017 and overall tolerated them very well. With this therapy, repeat CT scans from 10/18/2016, 11/29/2016 and 01/17/2017 (all summarized above) confirmed a very good, ongoing response in her disease. Given this good response and progressive side effects (particularly fatigue and treatment-related pancytopenia), active chemotherapy was held starting in mid-January 2017. As her disease did respond so well, she was referred to radiation oncology to consider PCI (prophylactic cranial irradiation); and she completed (and tolerated well) a two week course of this therapy in February 2017. Unfortunately, repeat CT scans on 04/19/2017 showed the (likely primary) right lower lobe nodule had clearly reprogressed. However, as this area appeared to be the only active site of recurrent cancer, she was re-referred to radiation oncology. They agreed that localized radiation alone to this limited area would potentially benefit her, and she completed thirty (30) radiation fractions by the end of July 2017. Now, the most recent repeat CT scans (from 09/25/2017, summarized above) show no definite signs of active disease (and  she will hopefully once again enjoy a prolonged remission). We will see her back in clinic in three months with a repeat CBC, CMP and CT scans.  2. Pain:  At least in part due to issue #1 and/or the palliative treatment of it. She discontinued long-acting Morphine as it caused her to itch. Continue scheduled Oxycontin and prn Percocet (a refill of the latter was provided today). Continue to monitor.  3. Anxiety: Secondary to her diagnosis with issue #1 and recently exacerbated by the death of her . Currently taking Xanax 1 mg TID. Her myriad of complaints (including weakness, fatigue, dizziness and recent falls) may, at least part, be due to her medicines. Particularly since issue #1 is currently in remission again, we will work to wean down on some of her sedating meds.  4. Vascular disease: This known issue is likely contributing to her current primary complaints (of pains, dizziness, fatigue, etc.). A planned CABG was indefinitely postponed once she was diagnosed with issue #1. Now that issue #1 is again in remission, she was advised to start following up with cardiology/CT surgery again.  5. Dizziness: Multifactorial. Will obtain a CT of the head along with the chest, abdomen and pelvis, prior to her next follow up appointment in three months.  6. Cough: The patient continues to smoke and was again advised on the importance of quitting. Refills for tessalon perles provided today.  The patient and her daughter were in agreement with these plans.    It is a pleasure to participate in Ms. Cat's care. Please do not hesitate to call with any questions or concerns that you may have.    A total of 50 minutes were spent coordinating this patient’s care in clinic today; 40 minutes of which were face-to-face with the patient and her daughter, reviewing her interim medical history, discussing the results of last week's repeat CT scans and counseling on the current treatment and followup plan. All questions were  answered to their satisfaction.    FOLLOW UP  Return to clinic in 3 months with a repeat CBC, CMP and CTs of the head, chest, abdomen and pelvis with contrast.        This document was electronically signed by JACKIE Bolaños MD October 5, 2017 12:18 PM      CC: MD Yeyo Dinh MD, PhD

## 2017-10-17 ENCOUNTER — TELEPHONE (OUTPATIENT)
Dept: ONCOLOGY | Facility: HOSPITAL | Age: 52
End: 2017-10-17

## 2017-10-17 RX ORDER — ALPRAZOLAM 1 MG/1
1 TABLET ORAL 3 TIMES DAILY PRN
Qty: 90 TABLET | Refills: 0 | Status: SHIPPED | OUTPATIENT
Start: 2017-10-17 | End: 2017-11-20 | Stop reason: SDUPTHER

## 2017-10-17 RX ORDER — OXYCODONE HYDROCHLORIDE 15 MG/1
15 TABLET, FILM COATED, EXTENDED RELEASE ORAL EVERY 12 HOURS
Qty: 60 TABLET | Refills: 0 | Status: SHIPPED | OUTPATIENT
Start: 2017-10-17 | End: 2017-11-20 | Stop reason: SDUPTHER

## 2017-11-01 RX ORDER — OXYCODONE AND ACETAMINOPHEN 10; 325 MG/1; MG/1
1 TABLET ORAL EVERY 4 HOURS PRN
Qty: 180 TABLET | Refills: 0 | Status: SHIPPED | OUTPATIENT
Start: 2017-11-01 | End: 2017-11-27 | Stop reason: SDUPTHER

## 2017-11-20 RX ORDER — ALPRAZOLAM 1 MG/1
1 TABLET ORAL 3 TIMES DAILY PRN
Qty: 90 TABLET | Refills: 0 | Status: SHIPPED | OUTPATIENT
Start: 2017-11-20 | End: 2017-12-18 | Stop reason: SDUPTHER

## 2017-11-20 RX ORDER — OXYCODONE HYDROCHLORIDE 15 MG/1
15 TABLET, FILM COATED, EXTENDED RELEASE ORAL EVERY 12 HOURS
Qty: 60 TABLET | Refills: 0 | Status: SHIPPED | OUTPATIENT
Start: 2017-11-20 | End: 2017-12-18 | Stop reason: SDUPTHER

## 2017-11-27 ENCOUNTER — TELEPHONE (OUTPATIENT)
Dept: ONCOLOGY | Facility: HOSPITAL | Age: 52
End: 2017-11-27

## 2017-11-27 RX ORDER — OXYCODONE AND ACETAMINOPHEN 10; 325 MG/1; MG/1
1 TABLET ORAL EVERY 4 HOURS PRN
Qty: 180 TABLET | Refills: 0 | Status: SHIPPED | OUTPATIENT
Start: 2017-11-27 | End: 2017-12-26 | Stop reason: SDUPTHER

## 2017-11-27 NOTE — TELEPHONE ENCOUNTER
I have attempted to call the patient back at the number she had contacted us from. I have talked to Dr. Calhoun and gotten the approval for the refill of the patient's percocet. The script has been signed and left at the  for pickup. I was unable to reach the patient and patient would not answer the phone that she had initially called from. The phone kept ringing with no voice messaging system available to me at that time.

## 2017-11-27 NOTE — TELEPHONE ENCOUNTER
----- Message from Aurora Roberts sent at 11/27/2017  9:00 AM EST -----  Regarding: REFILL REQUEST  Contact: [phone]  The patient is requesting a refill of her Percocet.  The patient's daughter will be in [place] this morning for a dental appointment with her children and would like to pick it up before she goes back to [place].

## 2017-12-18 RX ORDER — OXYCODONE HYDROCHLORIDE 15 MG/1
15 TABLET, FILM COATED, EXTENDED RELEASE ORAL EVERY 12 HOURS
Qty: 60 TABLET | Refills: 0 | Status: SHIPPED | OUTPATIENT
Start: 2017-12-18 | End: 2018-01-18 | Stop reason: SDUPTHER

## 2017-12-18 RX ORDER — ALPRAZOLAM 1 MG/1
1 TABLET ORAL 3 TIMES DAILY PRN
Qty: 90 TABLET | Refills: 0 | Status: SHIPPED | OUTPATIENT
Start: 2017-12-18 | End: 2018-01-18 | Stop reason: SDUPTHER

## 2017-12-19 ENCOUNTER — TELEPHONE (OUTPATIENT)
Dept: ONCOLOGY | Facility: HOSPITAL | Age: 52
End: 2017-12-19

## 2017-12-19 ENCOUNTER — PRIOR AUTHORIZATION (OUTPATIENT)
Dept: ONCOLOGY | Facility: HOSPITAL | Age: 52
End: 2017-12-19

## 2017-12-22 ENCOUNTER — TELEPHONE (OUTPATIENT)
Dept: ONCOLOGY | Facility: HOSPITAL | Age: 52
End: 2017-12-22

## 2017-12-22 NOTE — TELEPHONE ENCOUNTER
I spoke with iBnh at [facility] and told him that vera had received pa on 12/19/17 that was good for one year. He said that he would call the insurance company and see what is going on with the rx.

## 2017-12-22 NOTE — TELEPHONE ENCOUNTER
----- Message from Shirley Campos sent at 12/21/2017 11:32 AM EST -----  Regarding: ASHLEY Owens from [facility] in [place] called stating that Kylie's oxycontin 15 requires a precert.     [phone]

## 2017-12-26 RX ORDER — OXYCODONE AND ACETAMINOPHEN 10; 325 MG/1; MG/1
1 TABLET ORAL EVERY 4 HOURS PRN
Qty: 180 TABLET | Refills: 0 | Status: SHIPPED | OUTPATIENT
Start: 2017-12-26 | End: 2018-01-22 | Stop reason: SDUPTHER

## 2018-01-18 ENCOUNTER — TELEPHONE (OUTPATIENT)
Dept: ONCOLOGY | Facility: HOSPITAL | Age: 53
End: 2018-01-18

## 2018-01-18 RX ORDER — OXYCODONE HYDROCHLORIDE 15 MG/1
15 TABLET, FILM COATED, EXTENDED RELEASE ORAL EVERY 12 HOURS
Qty: 60 TABLET | Refills: 0 | Status: SHIPPED | OUTPATIENT
Start: 2018-01-18

## 2018-01-18 RX ORDER — ALPRAZOLAM 1 MG/1
1 TABLET ORAL 3 TIMES DAILY PRN
Qty: 90 TABLET | Refills: 0 | Status: SHIPPED | OUTPATIENT
Start: 2018-01-18

## 2018-01-18 NOTE — TELEPHONE ENCOUNTER
I have attempted multiple calls to make the patient aware that I have the scripts ready for her refill on her Xanax and Oxycontin. The refills were approved by Dr. Bolaños. Her breakthrough pain medication is not due at this time. The patient didn't answer any of the numbers we have from her. I also attempted to call her from the number that she had called from. Every number gave a busy signal each time. I was unable to reach the patient. Scripts have been left at .

## 2018-01-18 NOTE — TELEPHONE ENCOUNTER
----- Message from Aurora Roberts sent at 1/18/2018 11:01 AM EST -----  Regarding: REFILL REQUEST  Contact: [phone]  The patient is requesting refill of her pain meds.  Because of the distance they have to travel, they'd like to be able to pick everything up at the same time if possible.

## 2018-01-22 ENCOUNTER — TELEPHONE (OUTPATIENT)
Dept: ONCOLOGY | Facility: HOSPITAL | Age: 53
End: 2018-01-22

## 2018-01-22 RX ORDER — OXYCODONE AND ACETAMINOPHEN 10; 325 MG/1; MG/1
1 TABLET ORAL EVERY 4 HOURS PRN
Qty: 180 TABLET | Refills: 0 | Status: SHIPPED | OUTPATIENT
Start: 2018-01-22

## 2018-01-22 NOTE — TELEPHONE ENCOUNTER
----- Message from Shirley Campos sent at 1/22/2018  2:55 PM EST -----  Regarding: REFILL  Contact: [phone]  Patients daughter would like to know if she can get her percocet refilled?

## 2018-01-22 NOTE — TELEPHONE ENCOUNTER
I have gotten the refill of the patient's Percocet approved and signed by Dr. Calhoun. Script available for pickup at . I have attempted the patient's different numbers including the one we were called from, and I did not receive an answer or voice messaging system at either number.